# Patient Record
Sex: FEMALE
[De-identification: names, ages, dates, MRNs, and addresses within clinical notes are randomized per-mention and may not be internally consistent; named-entity substitution may affect disease eponyms.]

---

## 2017-05-17 ENCOUNTER — RESULT REVIEW (OUTPATIENT)
Age: 37
End: 2017-05-17

## 2018-08-13 ENCOUNTER — RESULT REVIEW (OUTPATIENT)
Age: 38
End: 2018-08-13

## 2018-11-01 ENCOUNTER — APPOINTMENT (OUTPATIENT)
Dept: HUMAN REPRODUCTION | Facility: CLINIC | Age: 38
End: 2018-11-01
Payer: COMMERCIAL

## 2018-11-01 PROCEDURE — 36415 COLL VENOUS BLD VENIPUNCTURE: CPT

## 2018-11-01 PROCEDURE — 99215 OFFICE O/P EST HI 40 MIN: CPT | Mod: 25

## 2018-11-07 ENCOUNTER — RX RENEWAL (OUTPATIENT)
Age: 38
End: 2018-11-07

## 2019-08-16 ENCOUNTER — APPOINTMENT (OUTPATIENT)
Dept: OBGYN | Facility: CLINIC | Age: 39
End: 2019-08-16

## 2019-10-01 ENCOUNTER — APPOINTMENT (OUTPATIENT)
Dept: OBGYN | Facility: CLINIC | Age: 39
End: 2019-10-01
Payer: COMMERCIAL

## 2019-10-01 VITALS
HEIGHT: 61 IN | DIASTOLIC BLOOD PRESSURE: 72 MMHG | WEIGHT: 112 LBS | SYSTOLIC BLOOD PRESSURE: 112 MMHG | BODY MASS INDEX: 21.14 KG/M2

## 2019-10-01 PROCEDURE — 99395 PREV VISIT EST AGE 18-39: CPT

## 2019-10-01 NOTE — HISTORY OF PRESENT ILLNESS
[Last Mammogram ___] : Last Mammogram was [unfilled] [Last Bone Density ___] : Last bone density studies [unfilled] [Last Colonoscopy ___] : Last colonoscopy [unfilled] [Last Pap ___] : Last cervical pap smear was [unfilled] [Definite:  ___ (Date)] : the last menstrual period was [unfilled] [Menarche Age: ____] : age at menarche was [unfilled] [Sexually Active] : is sexually active [Monogamous] : is monogamous [Contraception] : uses contraception [Oral Contraceptives] : uses oral contraceptives [Male ___] : [unfilled] male [No] : no

## 2019-10-01 NOTE — PHYSICAL EXAM
[Awake] : awake [Alert] : alert [Acute Distress] : no acute distress [Mass] : no breast mass [Nipple Discharge] : no nipple discharge [Axillary LAD] : no axillary lymphadenopathy [Soft] : soft [Tender] : non tender [Distended] : not distended [Oriented x3] : oriented to person, place, and time [Depressed Mood] : not depressed [Flat Affect] : affect not flat [Normal] : uterus [Labia Majora] : labia major [Labia Minora] : labia minora [Pap Obtained] : a Pap smear was performed [Tenderness] : nontender [Adnexa Tenderness] : were not tender

## 2019-10-01 NOTE — REVIEW OF SYSTEMS
[Nl] : Musculoskeletal [Fever] : no fever [Chills] : no chills [Chest Pain] : no chest pain [Dyspnea] : no shortness of breath [Abdominal Pain] : no abdominal pain [Vomiting] : no vomiting [Pelvic Pain] : no pelvic pain [Abn Vag Bleeding] : no abnormal vaginal bleeding

## 2019-10-07 LAB
CYTOLOGY CVX/VAG DOC THIN PREP: NORMAL
HPV HIGH+LOW RISK DNA PNL CVX: NOT DETECTED

## 2019-12-06 ENCOUNTER — APPOINTMENT (OUTPATIENT)
Dept: HUMAN REPRODUCTION | Facility: CLINIC | Age: 39
End: 2019-12-06
Payer: COMMERCIAL

## 2019-12-06 PROCEDURE — 99213 OFFICE O/P EST LOW 20 MIN: CPT | Mod: 25

## 2019-12-06 PROCEDURE — 36415 COLL VENOUS BLD VENIPUNCTURE: CPT

## 2019-12-06 PROCEDURE — 76830 TRANSVAGINAL US NON-OB: CPT

## 2019-12-13 ENCOUNTER — APPOINTMENT (OUTPATIENT)
Dept: HUMAN REPRODUCTION | Facility: CLINIC | Age: 39
End: 2019-12-13
Payer: COMMERCIAL

## 2019-12-13 PROCEDURE — 99213 OFFICE O/P EST LOW 20 MIN: CPT | Mod: 25

## 2019-12-13 PROCEDURE — 76830 TRANSVAGINAL US NON-OB: CPT

## 2019-12-16 ENCOUNTER — APPOINTMENT (OUTPATIENT)
Dept: HUMAN REPRODUCTION | Facility: CLINIC | Age: 39
End: 2019-12-16
Payer: COMMERCIAL

## 2019-12-16 PROCEDURE — 99213 OFFICE O/P EST LOW 20 MIN: CPT | Mod: 25

## 2019-12-16 PROCEDURE — 58322 ARTIFICIAL INSEMINATION: CPT

## 2020-01-03 ENCOUNTER — APPOINTMENT (OUTPATIENT)
Dept: HUMAN REPRODUCTION | Facility: CLINIC | Age: 40
End: 2020-01-03
Payer: COMMERCIAL

## 2020-01-03 PROCEDURE — 99212 OFFICE O/P EST SF 10 MIN: CPT

## 2020-01-03 PROCEDURE — 36415 COLL VENOUS BLD VENIPUNCTURE: CPT

## 2020-01-06 ENCOUNTER — APPOINTMENT (OUTPATIENT)
Dept: HUMAN REPRODUCTION | Facility: CLINIC | Age: 40
End: 2020-01-06
Payer: COMMERCIAL

## 2020-01-06 ENCOUNTER — TRANSCRIPTION ENCOUNTER (OUTPATIENT)
Age: 40
End: 2020-01-06

## 2020-01-06 PROCEDURE — 99213 OFFICE O/P EST LOW 20 MIN: CPT | Mod: 25

## 2020-01-06 PROCEDURE — 36415 COLL VENOUS BLD VENIPUNCTURE: CPT

## 2020-01-10 ENCOUNTER — APPOINTMENT (OUTPATIENT)
Dept: HUMAN REPRODUCTION | Facility: CLINIC | Age: 40
End: 2020-01-10
Payer: COMMERCIAL

## 2020-01-10 ENCOUNTER — APPOINTMENT (OUTPATIENT)
Dept: HUMAN REPRODUCTION | Facility: CLINIC | Age: 40
End: 2020-01-10

## 2020-01-10 PROCEDURE — 36415 COLL VENOUS BLD VENIPUNCTURE: CPT

## 2020-01-10 PROCEDURE — 99213 OFFICE O/P EST LOW 20 MIN: CPT | Mod: 25

## 2020-01-10 PROCEDURE — 76830 TRANSVAGINAL US NON-OB: CPT

## 2020-01-17 ENCOUNTER — APPOINTMENT (OUTPATIENT)
Dept: HUMAN REPRODUCTION | Facility: CLINIC | Age: 40
End: 2020-01-17
Payer: COMMERCIAL

## 2020-01-17 PROCEDURE — 99213 OFFICE O/P EST LOW 20 MIN: CPT | Mod: 25

## 2020-01-17 PROCEDURE — 76830 TRANSVAGINAL US NON-OB: CPT

## 2020-01-17 PROCEDURE — 36415 COLL VENOUS BLD VENIPUNCTURE: CPT

## 2020-01-18 ENCOUNTER — APPOINTMENT (OUTPATIENT)
Dept: HUMAN REPRODUCTION | Facility: CLINIC | Age: 40
End: 2020-01-18
Payer: COMMERCIAL

## 2020-01-18 PROCEDURE — 99213 OFFICE O/P EST LOW 20 MIN: CPT | Mod: 25

## 2020-01-18 PROCEDURE — 76830 TRANSVAGINAL US NON-OB: CPT

## 2020-01-18 PROCEDURE — 36415 COLL VENOUS BLD VENIPUNCTURE: CPT

## 2020-01-19 ENCOUNTER — APPOINTMENT (OUTPATIENT)
Dept: HUMAN REPRODUCTION | Facility: CLINIC | Age: 40
End: 2020-01-19
Payer: COMMERCIAL

## 2020-01-19 PROCEDURE — 58322 ARTIFICIAL INSEMINATION: CPT

## 2020-01-19 PROCEDURE — 89261 SPERM ISOLATION COMPLEX: CPT

## 2020-01-19 PROCEDURE — 99213 OFFICE O/P EST LOW 20 MIN: CPT | Mod: 25

## 2020-02-04 ENCOUNTER — APPOINTMENT (OUTPATIENT)
Dept: HUMAN REPRODUCTION | Facility: CLINIC | Age: 40
End: 2020-02-04
Payer: COMMERCIAL

## 2020-02-04 PROCEDURE — 99212 OFFICE O/P EST SF 10 MIN: CPT

## 2020-02-04 PROCEDURE — 36415 COLL VENOUS BLD VENIPUNCTURE: CPT

## 2020-02-06 ENCOUNTER — APPOINTMENT (OUTPATIENT)
Dept: HUMAN REPRODUCTION | Facility: CLINIC | Age: 40
End: 2020-02-06
Payer: COMMERCIAL

## 2020-02-06 PROCEDURE — 36415 COLL VENOUS BLD VENIPUNCTURE: CPT

## 2020-02-06 PROCEDURE — 99212 OFFICE O/P EST SF 10 MIN: CPT

## 2020-02-10 ENCOUNTER — APPOINTMENT (OUTPATIENT)
Dept: HUMAN REPRODUCTION | Facility: CLINIC | Age: 40
End: 2020-02-10
Payer: COMMERCIAL

## 2020-02-10 PROCEDURE — 36415 COLL VENOUS BLD VENIPUNCTURE: CPT

## 2020-02-10 PROCEDURE — 99212 OFFICE O/P EST SF 10 MIN: CPT

## 2020-02-13 ENCOUNTER — OUTPATIENT (OUTPATIENT)
Dept: OUTPATIENT SERVICES | Facility: HOSPITAL | Age: 40
LOS: 1 days | End: 2020-02-13
Payer: COMMERCIAL

## 2020-02-13 ENCOUNTER — APPOINTMENT (OUTPATIENT)
Dept: ULTRASOUND IMAGING | Facility: CLINIC | Age: 40
End: 2020-02-13
Payer: COMMERCIAL

## 2020-02-13 ENCOUNTER — APPOINTMENT (OUTPATIENT)
Dept: HUMAN REPRODUCTION | Facility: CLINIC | Age: 40
End: 2020-02-13
Payer: COMMERCIAL

## 2020-02-13 DIAGNOSIS — Z00.8 ENCOUNTER FOR OTHER GENERAL EXAMINATION: ICD-10-CM

## 2020-02-13 PROCEDURE — 36415 COLL VENOUS BLD VENIPUNCTURE: CPT

## 2020-02-13 PROCEDURE — 76815 OB US LIMITED FETUS(S): CPT

## 2020-02-13 PROCEDURE — 76817 TRANSVAGINAL US OBSTETRIC: CPT

## 2020-02-13 PROCEDURE — 76815 OB US LIMITED FETUS(S): CPT | Mod: 26

## 2020-02-13 PROCEDURE — 76817 TRANSVAGINAL US OBSTETRIC: CPT | Mod: 26

## 2020-02-13 PROCEDURE — 99213 OFFICE O/P EST LOW 20 MIN: CPT | Mod: 25

## 2020-02-15 ENCOUNTER — APPOINTMENT (OUTPATIENT)
Dept: HUMAN REPRODUCTION | Facility: CLINIC | Age: 40
End: 2020-02-15
Payer: COMMERCIAL

## 2020-02-15 PROCEDURE — 36415 COLL VENOUS BLD VENIPUNCTURE: CPT

## 2020-02-15 PROCEDURE — 99213 OFFICE O/P EST LOW 20 MIN: CPT | Mod: 25

## 2020-02-15 PROCEDURE — 76817 TRANSVAGINAL US OBSTETRIC: CPT

## 2020-02-18 ENCOUNTER — APPOINTMENT (OUTPATIENT)
Dept: HUMAN REPRODUCTION | Facility: CLINIC | Age: 40
End: 2020-02-18
Payer: COMMERCIAL

## 2020-02-18 PROCEDURE — 99213 OFFICE O/P EST LOW 20 MIN: CPT | Mod: 25

## 2020-02-18 PROCEDURE — 76817 TRANSVAGINAL US OBSTETRIC: CPT

## 2020-02-20 ENCOUNTER — APPOINTMENT (OUTPATIENT)
Dept: OBGYN | Facility: AMBULATORY SURGERY CENTER | Age: 40
End: 2020-02-20
Payer: COMMERCIAL

## 2020-02-20 ENCOUNTER — ASOB RESULT (OUTPATIENT)
Age: 40
End: 2020-02-20

## 2020-02-20 ENCOUNTER — APPOINTMENT (OUTPATIENT)
Dept: OBGYN | Facility: CLINIC | Age: 40
End: 2020-02-20
Payer: COMMERCIAL

## 2020-02-20 VITALS
HEIGHT: 61 IN | DIASTOLIC BLOOD PRESSURE: 76 MMHG | BODY MASS INDEX: 21.14 KG/M2 | WEIGHT: 112 LBS | SYSTOLIC BLOOD PRESSURE: 110 MMHG

## 2020-02-20 DIAGNOSIS — R87.619 UNSPECIFIED ABNORMAL CYTOLOGICAL FINDINGS IN SPECIMENS FROM CERVIX UTERI: ICD-10-CM

## 2020-02-20 DIAGNOSIS — O09.811 SUPERVISION OF PREGNANCY RESULTING FROM ASSISTED REPRODUCTIVE TECHNOLOGY, FIRST TRIMESTER: ICD-10-CM

## 2020-02-20 DIAGNOSIS — O09.521 SUPERVISION OF ELDERLY MULTIGRAVIDA, FIRST TRIMESTER: ICD-10-CM

## 2020-02-20 DIAGNOSIS — Z86.19 PERSONAL HISTORY OF OTHER INFECTIOUS AND PARASITIC DISEASES: ICD-10-CM

## 2020-02-20 DIAGNOSIS — O99.281 ENDOCRINE, NUTRITIONAL AND METABOLIC DISEASES COMPLICATING PREGNANCY, FIRST TRIMESTER: ICD-10-CM

## 2020-02-20 DIAGNOSIS — Z30.9 ENCOUNTER FOR CONTRACEPTIVE MANAGEMENT, UNSPECIFIED: ICD-10-CM

## 2020-02-20 DIAGNOSIS — Z3A.11 11 WEEKS GESTATION OF PREGNANCY: ICD-10-CM

## 2020-02-20 DIAGNOSIS — O09.11 SUPERVISION OF PREGNANCY WITH HISRY OF ECPIC PREGNANCY, FIRST TRIMESTER: ICD-10-CM

## 2020-02-20 DIAGNOSIS — E03.9 ENDOCRINE, NUTRITIONAL AND METABOLIC DISEASES COMPLICATING PREGNANCY, FIRST TRIMESTER: ICD-10-CM

## 2020-02-20 PROCEDURE — 76817 TRANSVAGINAL US OBSTETRIC: CPT

## 2020-02-20 PROCEDURE — 99214 OFFICE O/P EST MOD 30 MIN: CPT | Mod: 25

## 2020-02-20 RX ORDER — NORGESTIMATE AND ETHINYL ESTRADIOL 0.25-0.035
0.25-35 KIT ORAL DAILY
Qty: 3 | Refills: 3 | Status: DISCONTINUED | COMMUNITY
Start: 2019-10-01 | End: 2020-02-20

## 2020-02-20 RX ORDER — LEVOTHYROXINE SODIUM 0.03 MG/1
25 TABLET ORAL DAILY
Qty: 30 | Refills: 3 | Status: COMPLETED | COMMUNITY
Start: 2018-11-07 | End: 2020-02-20

## 2020-02-20 NOTE — REVIEW OF SYSTEMS
[Nl] : Integumentary [Chills] : no chills [Fever] : no fever [Abn Vag Bleeding] : no abnormal vaginal bleeding [Pelvic Pain] : no pelvic pain

## 2020-02-20 NOTE — HISTORY OF PRESENT ILLNESS
[___ Month(s) Ago] : [unfilled] month(s) ago [Good] : being in good health [Healthy Diet] : a healthy diet [Last Pap ___] : Last cervical pap smear was [unfilled] [Reproductive Age] : is of reproductive age [Menstrual Problems] : reports abnormal menses [Pregnancy History] : pregnancy history: [Hot Flashes] : hot flashes [Night Sweats] : night sweats [Definite:  ___ (Date)] : the last menstrual period was [unfilled] [Menarche Age: ____] : age at menarche was [unfilled] [Contraception] : uses contraception [Monogamous] : is monogamous [Sexually Active] : is sexually active [Male ___] : [unfilled] male [No] : no [Burning] : no burning [Itching] : no itching [Stinging] : no stinging [Mass] : no mass [Lesion] : no lesion [Soreness] : no soreness [Discharge] : no discharge [Mass (___cm)] : no palpable mass [Localized Pain] : no localized pain [Diffused Pain] : no diffused pain [Nipple Discharge] : no nipple discharge [Skin Color Change] : no skin color change

## 2020-02-27 ENCOUNTER — APPOINTMENT (OUTPATIENT)
Dept: OBGYN | Facility: CLINIC | Age: 40
End: 2020-02-27
Payer: COMMERCIAL

## 2020-02-27 ENCOUNTER — ASOB RESULT (OUTPATIENT)
Age: 40
End: 2020-02-27

## 2020-02-27 ENCOUNTER — NON-APPOINTMENT (OUTPATIENT)
Age: 40
End: 2020-02-27

## 2020-02-27 VITALS — WEIGHT: 113 LBS | BODY MASS INDEX: 21.35 KG/M2 | DIASTOLIC BLOOD PRESSURE: 66 MMHG | SYSTOLIC BLOOD PRESSURE: 104 MMHG

## 2020-02-27 LAB
BILIRUB UR QL STRIP: NORMAL
GLUCOSE UR-MCNC: NORMAL
HCG UR QL: 0.2 EU/DL
HGB UR QL STRIP.AUTO: NORMAL
KETONES UR-MCNC: NORMAL
LEUKOCYTE ESTERASE UR QL STRIP: NORMAL
NITRITE UR QL STRIP: NORMAL
PH UR STRIP: 7
PROT UR STRIP-MCNC: NORMAL
SP GR UR STRIP: 1.01

## 2020-02-27 PROCEDURE — 0500F INITIAL PRENATAL CARE VISIT: CPT

## 2020-02-27 PROCEDURE — 76817 TRANSVAGINAL US OBSTETRIC: CPT

## 2020-02-27 PROCEDURE — 99214 OFFICE O/P EST MOD 30 MIN: CPT | Mod: 25

## 2020-02-27 PROCEDURE — 36415 COLL VENOUS BLD VENIPUNCTURE: CPT

## 2020-02-29 LAB
ABO + RH PNL BLD: NORMAL
BASOPHILS # BLD AUTO: 0.03 K/UL
BASOPHILS NFR BLD AUTO: 0.5 %
BLD GP AB SCN SERPL QL: NORMAL
EOSINOPHIL # BLD AUTO: 0.16 K/UL
EOSINOPHIL NFR BLD AUTO: 2.6 %
ESTIMATED AVERAGE GLUCOSE: 97 MG/DL
HBA1C MFR BLD HPLC: 5 %
HBV SURFACE AG SER QL: NONREACTIVE
HCT VFR BLD CALC: 42.2 %
HGB BLD-MCNC: 13.6 G/DL
HIV1+2 AB SPEC QL IA.RAPID: NONREACTIVE
IMM GRANULOCYTES NFR BLD AUTO: 0.6 %
LYMPHOCYTES # BLD AUTO: 1.74 K/UL
LYMPHOCYTES NFR BLD AUTO: 27.9 %
MAN DIFF?: NORMAL
MCHC RBC-ENTMCNC: 30.8 PG
MCHC RBC-ENTMCNC: 32.2 GM/DL
MCV RBC AUTO: 95.7 FL
MONOCYTES # BLD AUTO: 0.49 K/UL
MONOCYTES NFR BLD AUTO: 7.9 %
NEUTROPHILS # BLD AUTO: 3.77 K/UL
NEUTROPHILS NFR BLD AUTO: 60.5 %
PLATELET # BLD AUTO: 378 K/UL
RBC # BLD: 4.41 M/UL
RBC # FLD: 13.2 %
T PALLIDUM AB SER QL IA: NEGATIVE
TSH SERPL-ACNC: 1.57 UIU/ML
WBC # FLD AUTO: 6.23 K/UL

## 2020-03-02 ENCOUNTER — TRANSCRIPTION ENCOUNTER (OUTPATIENT)
Age: 40
End: 2020-03-02

## 2020-03-03 LAB
B19V IGG SER QL IA: 8 INDEX
B19V IGG+IGM SER-IMP: NORMAL
B19V IGG+IGM SER-IMP: POSITIVE
B19V IGM FLD-ACNC: 0.2
B19V IGM SER-ACNC: NEGATIVE
CMV IGG SERPL QL: <0.2 U/ML
CMV IGG SERPL-IMP: NEGATIVE
CMV IGM SERPL QL: <8 AU/ML
CMV IGM SERPL QL: NEGATIVE
HGB A MFR BLD: 97.2 %
HGB A2 MFR BLD: 2.8 %
HGB FRACT BLD-IMP: NORMAL
MEV IGG FLD QL IA: >300 AU/ML
MEV IGG+IGM SER-IMP: POSITIVE
RUBV IGG FLD-ACNC: 10.8 INDEX
RUBV IGG SER-IMP: POSITIVE
T GONDII AB SER-IMP: NEGATIVE
T GONDII AB SER-IMP: NEGATIVE
T GONDII IGG SER QL: 3.9 IU/ML
T GONDII IGM SER QL: <3 AU/ML

## 2020-03-04 LAB — FMR1 GENE MUT ANL BLD/T: NORMAL

## 2020-03-07 LAB
AR GENE MUT ANL BLD/T: NEGATIVE
CFTR MUT TESTED BLD/T: NEGATIVE

## 2020-03-16 ENCOUNTER — APPOINTMENT (OUTPATIENT)
Dept: OBGYN | Facility: HOSPITAL | Age: 40
End: 2020-03-16

## 2020-03-16 ENCOUNTER — APPOINTMENT (OUTPATIENT)
Dept: OBGYN | Facility: CLINIC | Age: 40
End: 2020-03-16
Payer: COMMERCIAL

## 2020-03-16 ENCOUNTER — ASOB RESULT (OUTPATIENT)
Age: 40
End: 2020-03-16

## 2020-03-16 VITALS
DIASTOLIC BLOOD PRESSURE: 78 MMHG | HEIGHT: 61 IN | WEIGHT: 114 LBS | BODY MASS INDEX: 21.52 KG/M2 | SYSTOLIC BLOOD PRESSURE: 132 MMHG

## 2020-03-16 DIAGNOSIS — Z81.8 FAMILY HISTORY OF OTHER MENTAL AND BEHAVIORAL DISORDERS: ICD-10-CM

## 2020-03-16 PROCEDURE — 76817 TRANSVAGINAL US OBSTETRIC: CPT

## 2020-03-16 PROCEDURE — 99213 OFFICE O/P EST LOW 20 MIN: CPT | Mod: 25

## 2020-03-16 PROCEDURE — 36415 COLL VENOUS BLD VENIPUNCTURE: CPT

## 2020-03-16 PROCEDURE — 81003 URINALYSIS AUTO W/O SCOPE: CPT | Mod: QW

## 2020-03-18 ENCOUNTER — TRANSCRIPTION ENCOUNTER (OUTPATIENT)
Age: 40
End: 2020-03-18

## 2020-03-24 ENCOUNTER — APPOINTMENT (OUTPATIENT)
Dept: OBGYN | Facility: CLINIC | Age: 40
End: 2020-03-24

## 2020-03-24 LAB — HCG SERPL-MCNC: 1380 MIU/ML

## 2020-03-26 LAB — HCG SERPL-MCNC: 44 MIU/ML

## 2020-03-27 LAB
BILIRUB UR QL STRIP: NORMAL
GLUCOSE UR-MCNC: NORMAL
HCG UR QL: 0.2 EU/DL
HGB UR QL STRIP.AUTO: ABNORMAL
KETONES UR-MCNC: NORMAL
LEUKOCYTE ESTERASE UR QL STRIP: ABNORMAL
NITRITE UR QL STRIP: NORMAL
PH UR STRIP: 5.5
PROT UR STRIP-MCNC: ABNORMAL
SP GR UR STRIP: 1.01

## 2020-03-30 ENCOUNTER — APPOINTMENT (OUTPATIENT)
Dept: OBGYN | Facility: CLINIC | Age: 40
End: 2020-03-30

## 2020-04-03 LAB — HCG SERPL-MCNC: 5 MIU/ML

## 2020-04-06 ENCOUNTER — APPOINTMENT (OUTPATIENT)
Dept: MATERNAL FETAL MEDICINE | Facility: CLINIC | Age: 40
End: 2020-04-06

## 2020-04-06 ENCOUNTER — APPOINTMENT (OUTPATIENT)
Dept: ANTEPARTUM | Facility: CLINIC | Age: 40
End: 2020-04-06

## 2020-04-09 ENCOUNTER — TRANSCRIPTION ENCOUNTER (OUTPATIENT)
Age: 40
End: 2020-04-09

## 2020-04-09 LAB — HCG SERPL-MCNC: 1 MIU/ML

## 2020-04-21 ENCOUNTER — APPOINTMENT (OUTPATIENT)
Dept: MATERNAL FETAL MEDICINE | Facility: CLINIC | Age: 40
End: 2020-04-21

## 2020-04-21 ENCOUNTER — APPOINTMENT (OUTPATIENT)
Dept: ANTEPARTUM | Facility: CLINIC | Age: 40
End: 2020-04-21

## 2020-05-26 ENCOUNTER — APPOINTMENT (OUTPATIENT)
Dept: ANTEPARTUM | Facility: CLINIC | Age: 40
End: 2020-05-26

## 2020-06-15 ENCOUNTER — APPOINTMENT (OUTPATIENT)
Dept: HUMAN REPRODUCTION | Facility: CLINIC | Age: 40
End: 2020-06-15

## 2020-11-18 ENCOUNTER — APPOINTMENT (OUTPATIENT)
Dept: HUMAN REPRODUCTION | Facility: CLINIC | Age: 40
End: 2020-11-18
Payer: COMMERCIAL

## 2020-11-18 PROCEDURE — 99215 OFFICE O/P EST HI 40 MIN: CPT | Mod: 95

## 2020-12-07 ENCOUNTER — APPOINTMENT (OUTPATIENT)
Dept: HUMAN REPRODUCTION | Facility: CLINIC | Age: 40
End: 2020-12-07
Payer: COMMERCIAL

## 2020-12-07 PROCEDURE — 76830 TRANSVAGINAL US NON-OB: CPT

## 2020-12-07 PROCEDURE — 99213Y: CUSTOM | Mod: 25

## 2020-12-07 PROCEDURE — 36415 COLL VENOUS BLD VENIPUNCTURE: CPT

## 2020-12-07 PROCEDURE — 99072 ADDL SUPL MATRL&STAF TM PHE: CPT

## 2020-12-14 ENCOUNTER — APPOINTMENT (OUTPATIENT)
Dept: HUMAN REPRODUCTION | Facility: CLINIC | Age: 40
End: 2020-12-14
Payer: COMMERCIAL

## 2020-12-14 PROCEDURE — 99072 ADDL SUPL MATRL&STAF TM PHE: CPT

## 2020-12-14 PROCEDURE — 76830 TRANSVAGINAL US NON-OB: CPT

## 2020-12-14 PROCEDURE — 99213 OFFICE O/P EST LOW 20 MIN: CPT | Mod: 25

## 2020-12-14 PROCEDURE — 36415 COLL VENOUS BLD VENIPUNCTURE: CPT

## 2020-12-31 ENCOUNTER — APPOINTMENT (OUTPATIENT)
Dept: HUMAN REPRODUCTION | Facility: CLINIC | Age: 40
End: 2020-12-31
Payer: COMMERCIAL

## 2020-12-31 PROCEDURE — 76830 TRANSVAGINAL US NON-OB: CPT

## 2020-12-31 PROCEDURE — 99072 ADDL SUPL MATRL&STAF TM PHE: CPT

## 2020-12-31 PROCEDURE — 36415 COLL VENOUS BLD VENIPUNCTURE: CPT

## 2020-12-31 PROCEDURE — 99213 OFFICE O/P EST LOW 20 MIN: CPT | Mod: 25

## 2021-01-11 ENCOUNTER — APPOINTMENT (OUTPATIENT)
Dept: HUMAN REPRODUCTION | Facility: CLINIC | Age: 41
End: 2021-01-11
Payer: COMMERCIAL

## 2021-01-11 PROCEDURE — 99072 ADDL SUPL MATRL&STAF TM PHE: CPT

## 2021-01-11 PROCEDURE — 99213 OFFICE O/P EST LOW 20 MIN: CPT | Mod: 25

## 2021-01-11 PROCEDURE — 76830 TRANSVAGINAL US NON-OB: CPT

## 2021-01-27 ENCOUNTER — APPOINTMENT (OUTPATIENT)
Dept: HUMAN REPRODUCTION | Facility: CLINIC | Age: 41
End: 2021-01-27
Payer: COMMERCIAL

## 2021-01-27 PROCEDURE — 99211 OFF/OP EST MAY X REQ PHY/QHP: CPT | Mod: 25

## 2021-01-27 PROCEDURE — 99072 ADDL SUPL MATRL&STAF TM PHE: CPT

## 2021-01-27 PROCEDURE — 36415 COLL VENOUS BLD VENIPUNCTURE: CPT

## 2021-01-29 ENCOUNTER — APPOINTMENT (OUTPATIENT)
Dept: HUMAN REPRODUCTION | Facility: CLINIC | Age: 41
End: 2021-01-29
Payer: COMMERCIAL

## 2021-01-29 PROCEDURE — 99072 ADDL SUPL MATRL&STAF TM PHE: CPT

## 2021-01-29 PROCEDURE — 36415 COLL VENOUS BLD VENIPUNCTURE: CPT

## 2021-01-29 PROCEDURE — 99211 OFF/OP EST MAY X REQ PHY/QHP: CPT | Mod: 25

## 2021-02-05 ENCOUNTER — APPOINTMENT (OUTPATIENT)
Dept: HUMAN REPRODUCTION | Facility: CLINIC | Age: 41
End: 2021-02-05
Payer: COMMERCIAL

## 2021-02-05 PROCEDURE — 99213 OFFICE O/P EST LOW 20 MIN: CPT | Mod: 25

## 2021-02-05 PROCEDURE — 99072 ADDL SUPL MATRL&STAF TM PHE: CPT

## 2021-02-05 PROCEDURE — 76817 TRANSVAGINAL US OBSTETRIC: CPT

## 2021-02-05 PROCEDURE — 36415 COLL VENOUS BLD VENIPUNCTURE: CPT

## 2021-02-12 ENCOUNTER — APPOINTMENT (OUTPATIENT)
Dept: HUMAN REPRODUCTION | Facility: CLINIC | Age: 41
End: 2021-02-12
Payer: COMMERCIAL

## 2021-02-12 PROCEDURE — 99072 ADDL SUPL MATRL&STAF TM PHE: CPT

## 2021-02-12 PROCEDURE — 99213 OFFICE O/P EST LOW 20 MIN: CPT | Mod: 25

## 2021-02-12 PROCEDURE — 76817 TRANSVAGINAL US OBSTETRIC: CPT

## 2021-02-12 PROCEDURE — 36415 COLL VENOUS BLD VENIPUNCTURE: CPT

## 2021-02-16 ENCOUNTER — APPOINTMENT (OUTPATIENT)
Dept: OBGYN | Facility: CLINIC | Age: 41
End: 2021-02-16
Payer: COMMERCIAL

## 2021-02-16 PROCEDURE — 99212 OFFICE O/P EST SF 10 MIN: CPT | Mod: 95

## 2021-02-16 NOTE — REASON FOR VISIT
[Home] : at home, [unfilled] , at the time of the visit. [Medical Office: ([facility])___] : at the medical office located in  [Verbal consent obtained from patient] : the patient, [unfilled]

## 2021-02-16 NOTE — HISTORY OF PRESENT ILLNESS
[FreeTextEntry1] : \par Patients name and date of birth were verified and verbal consent was obtained.\par \par She was informed of her ability to request an in office visit and that an in-office visit may be advised at the conclusion of this encounter.\par \par We discussed and provided via [email or web address]'s telehealth application will incorporate security protocols to protect PHI.  There is also the possibility of connection disruption during the visit.  Every effort will be made to re-establish a connection if disconnection occurs. However, there is a possibility the session may be terminated.  \par \par She was informed of telehealth services incur charges similar to an office visit.\par \par She provided verbal consent and wishes to proceed with the visit.\par \par __________________________\par \par Lily is 39 y/o  who presents for telehealth visit for confirmation of pregnancy.\par \par LMP was 2020. She has been seeing Dr. Kc and tested positive for pregnancy in office 2 weeks ago. \par \par Last pap was 10/2019.\par \par Denies vaginal bleeding.\par \par Denies pelvic and vaginal pain.\par \par Reports taking prenatal vitamins.\par \par \par

## 2021-02-17 ENCOUNTER — APPOINTMENT (OUTPATIENT)
Dept: OBGYN | Facility: CLINIC | Age: 41
End: 2021-02-17

## 2021-02-18 ENCOUNTER — APPOINTMENT (OUTPATIENT)
Dept: HUMAN REPRODUCTION | Facility: CLINIC | Age: 41
End: 2021-02-18
Payer: COMMERCIAL

## 2021-02-18 PROCEDURE — 99072 ADDL SUPL MATRL&STAF TM PHE: CPT

## 2021-02-18 PROCEDURE — 36415 COLL VENOUS BLD VENIPUNCTURE: CPT

## 2021-02-18 PROCEDURE — 76830 TRANSVAGINAL US NON-OB: CPT

## 2021-02-18 PROCEDURE — 99213 OFFICE O/P EST LOW 20 MIN: CPT | Mod: 25

## 2021-02-19 ENCOUNTER — APPOINTMENT (OUTPATIENT)
Dept: OBGYN | Facility: CLINIC | Age: 41
End: 2021-02-19
Payer: COMMERCIAL

## 2021-02-19 VITALS
BODY MASS INDEX: 22.09 KG/M2 | HEIGHT: 61 IN | DIASTOLIC BLOOD PRESSURE: 70 MMHG | SYSTOLIC BLOOD PRESSURE: 100 MMHG | WEIGHT: 117 LBS | TEMPERATURE: 96.5 F

## 2021-02-19 LAB
HCG UR QL: POSITIVE
QUALITY CONTROL: YES

## 2021-02-19 PROCEDURE — 99395 PREV VISIT EST AGE 18-39: CPT

## 2021-02-19 PROCEDURE — 99213 OFFICE O/P EST LOW 20 MIN: CPT | Mod: 25

## 2021-02-19 PROCEDURE — 99072 ADDL SUPL MATRL&STAF TM PHE: CPT

## 2021-02-19 PROCEDURE — 81025 URINE PREGNANCY TEST: CPT

## 2021-02-19 NOTE — HISTORY OF PRESENT ILLNESS
[Patient reported PAP Smear was normal] : Patient reported PAP Smear was normal [HIV Test offered] : HIV Test offered [Syphilis test offered] : Syphilis test offered [HPV test offered] : HPV test offered [Hepatitis B test offered] : Hepatitis B test offered [Hepatitis C test offered] : Hepatitis C test offered [N] : Patient does not use contraception [Monogamous (Male Partner)] : is monogamous with a male partner [Y] : Positive pregnancy history [Menarche Age: ____] : age at menarche was [unfilled] [Currently Active] : currently active [Men] : men [Vaginal] : vaginal [No] : No [Patient refuses STI testing] : Patient refuses STI testing [TextBox_4] : PATIENT PRESENTS FOR ANNUAL EXAM. [PapSmeardate] : 10/01/19 [TextBox_31] : NEG [HIVDate] : 02/27/20 [TextBox_53] : NEG [SyphilisDate] : 02/27/20 [TextBox_58] : NEG [TextBox_78] : NEG [HPVDate] : 10/01/19 [HepatitisBDate] : 02/27/20 [TextBox_83] : NEG [HepatitisCDate] : 02/27/20 [LMPDate] : 12/31/20 [TextBox_88] : NEG [PGxTotal] : 7 [[facility]] : 2 [[facility]] : 2 [PGHxABSpont] : 2 [PGxEctopic] : 2 [FreeTextEntry1] : 12/31/20

## 2021-02-19 NOTE — DISCUSSION/SUMMARY
[FreeTextEntry1] : PAP/HPV/ GC Chlamydia performed\par \par REviewed healthy eating and increasing water intake.\par Advised pt to avoid raw seafood, deli meats and unpasteurized dairy.\par \par Reviewed call parameters.\par \par Pt has a New OB appt in 2 weeks with

## 2021-02-24 LAB
C TRACH RRNA SPEC QL NAA+PROBE: NOT DETECTED
HPV HIGH+LOW RISK DNA PNL CVX: NOT DETECTED
N GONORRHOEA RRNA SPEC QL NAA+PROBE: NOT DETECTED
SOURCE TP AMPLIFICATION: NORMAL

## 2021-02-25 LAB — CYTOLOGY CVX/VAG DOC THIN PREP: ABNORMAL

## 2021-03-03 ENCOUNTER — NON-APPOINTMENT (OUTPATIENT)
Age: 41
End: 2021-03-03

## 2021-03-03 ENCOUNTER — APPOINTMENT (OUTPATIENT)
Dept: OBGYN | Facility: CLINIC | Age: 41
End: 2021-03-03
Payer: COMMERCIAL

## 2021-03-03 ENCOUNTER — ASOB RESULT (OUTPATIENT)
Age: 41
End: 2021-03-03

## 2021-03-03 VITALS
DIASTOLIC BLOOD PRESSURE: 74 MMHG | WEIGHT: 117 LBS | BODY MASS INDEX: 22.09 KG/M2 | HEIGHT: 61 IN | SYSTOLIC BLOOD PRESSURE: 106 MMHG

## 2021-03-03 LAB
BILIRUB UR QL STRIP: NORMAL
GLUCOSE UR-MCNC: NORMAL
HCG UR QL: 1 EU/DL
HGB UR QL STRIP.AUTO: ABNORMAL
KETONES UR-MCNC: NORMAL
LEUKOCYTE ESTERASE UR QL STRIP: NORMAL
NITRITE UR QL STRIP: NORMAL
PH UR STRIP: 6
PROT UR STRIP-MCNC: NORMAL
SP GR UR STRIP: 1.02

## 2021-03-03 PROCEDURE — 36415 COLL VENOUS BLD VENIPUNCTURE: CPT

## 2021-03-03 PROCEDURE — 99072 ADDL SUPL MATRL&STAF TM PHE: CPT

## 2021-03-03 PROCEDURE — 76817 TRANSVAGINAL US OBSTETRIC: CPT

## 2021-03-03 PROCEDURE — 0500F INITIAL PRENATAL CARE VISIT: CPT

## 2021-03-18 ENCOUNTER — APPOINTMENT (OUTPATIENT)
Dept: MATERNAL FETAL MEDICINE | Facility: CLINIC | Age: 41
End: 2021-03-18
Payer: COMMERCIAL

## 2021-03-18 ENCOUNTER — ASOB RESULT (OUTPATIENT)
Age: 41
End: 2021-03-18

## 2021-03-18 PROCEDURE — 99202 OFFICE O/P NEW SF 15 MIN: CPT | Mod: 95

## 2021-03-25 ENCOUNTER — NON-APPOINTMENT (OUTPATIENT)
Age: 41
End: 2021-03-25

## 2021-03-25 ENCOUNTER — APPOINTMENT (OUTPATIENT)
Dept: OBGYN | Facility: CLINIC | Age: 41
End: 2021-03-25
Payer: COMMERCIAL

## 2021-03-25 ENCOUNTER — ASOB RESULT (OUTPATIENT)
Age: 41
End: 2021-03-25

## 2021-03-25 VITALS
HEIGHT: 61 IN | SYSTOLIC BLOOD PRESSURE: 120 MMHG | WEIGHT: 119 LBS | BODY MASS INDEX: 22.47 KG/M2 | DIASTOLIC BLOOD PRESSURE: 68 MMHG

## 2021-03-25 LAB
ABO + RH PNL BLD: NORMAL
AR GENE MUT ANL BLD/T: NORMAL
B19V IGG SER QL IA: 8.74 INDEX
B19V IGG+IGM SER-IMP: NORMAL
B19V IGG+IGM SER-IMP: POSITIVE
B19V IGM FLD-ACNC: 0.2 INDEX
B19V IGM SER-ACNC: NEGATIVE
BASOPHILS # BLD AUTO: 0.02 K/UL
BASOPHILS NFR BLD AUTO: 0.2 %
BILIRUB UR QL STRIP: NORMAL
BLD GP AB SCN SERPL QL: NORMAL
CFTR MUT TESTED BLD/T: NEGATIVE
CMV IGG SERPL QL: <0.2 U/ML
CMV IGG SERPL-IMP: NEGATIVE
CMV IGM SERPL QL: <8 AU/ML
CMV IGM SERPL QL: NEGATIVE
COLLECTION METHOD: NORMAL
EOSINOPHIL # BLD AUTO: 0.31 K/UL
EOSINOPHIL NFR BLD AUTO: 3.9 %
ESTIMATED AVERAGE GLUCOSE: 94 MG/DL
FMR1 GENE MUT ANL BLD/T: NORMAL
GLUCOSE UR-MCNC: NORMAL
HBA1C MFR BLD HPLC: 4.9 %
HBV SURFACE AG SER QL: NONREACTIVE
HCG UR QL: 0.2 EU/DL
HCT VFR BLD CALC: 38.4 %
HGB A MFR BLD: 97.4 %
HGB A2 MFR BLD: 2.6 %
HGB BLD-MCNC: 12.7 G/DL
HGB FRACT BLD-IMP: NORMAL
HGB UR QL STRIP.AUTO: NORMAL
HIV1+2 AB SPEC QL IA.RAPID: NONREACTIVE
IMM GRANULOCYTES NFR BLD AUTO: 0.2 %
KETONES UR-MCNC: NORMAL
LEUKOCYTE ESTERASE UR QL STRIP: NORMAL
LYMPHOCYTES # BLD AUTO: 2.46 K/UL
LYMPHOCYTES NFR BLD AUTO: 30.7 %
M TB IFN-G BLD-IMP: NEGATIVE
MAN DIFF?: NORMAL
MCHC RBC-ENTMCNC: 30.7 PG
MCHC RBC-ENTMCNC: 33.1 GM/DL
MCV RBC AUTO: 92.8 FL
MEV IGG FLD QL IA: >300 AU/ML
MEV IGG+IGM SER-IMP: POSITIVE
MEV IGM SER QL: <0.91 ISR
MONOCYTES # BLD AUTO: 0.79 K/UL
MONOCYTES NFR BLD AUTO: 9.9 %
NEUTROPHILS # BLD AUTO: 4.42 K/UL
NEUTROPHILS NFR BLD AUTO: 55.1 %
NITRITE UR QL STRIP: NORMAL
PH UR STRIP: 5.5
PLATELET # BLD AUTO: 348 K/UL
PROT UR STRIP-MCNC: NORMAL
QUANTIFERON TB PLUS MITOGEN MINUS NIL: 9.47 IU/ML
QUANTIFERON TB PLUS NIL: 0.01 IU/ML
QUANTIFERON TB PLUS TB1 MINUS NIL: 0 IU/ML
QUANTIFERON TB PLUS TB2 MINUS NIL: 0 IU/ML
RBC # BLD: 4.14 M/UL
RBC # FLD: 13.1 %
RUBV IGG FLD-ACNC: 12.2 INDEX
RUBV IGG SER-IMP: POSITIVE
SP GR UR STRIP: 1.02
T GONDII AB SER-IMP: NEGATIVE
T GONDII AB SER-IMP: NEGATIVE
T GONDII IGG SER QL: 3 IU/ML
T GONDII IGM SER QL: <3 AU/ML
T PALLIDUM AB SER QL IA: NEGATIVE
T3FREE SERPL-MCNC: 3.07 PG/ML
T4 FREE SERPL-MCNC: 1.5 NG/DL
TSH SERPL-ACNC: 1.1 UIU/ML
VZV AB TITR SER: POSITIVE
VZV IGG SER IF-ACNC: 245.5 INDEX
WBC # FLD AUTO: 8.02 K/UL

## 2021-03-25 PROCEDURE — 99072 ADDL SUPL MATRL&STAF TM PHE: CPT

## 2021-03-25 PROCEDURE — 0502F SUBSEQUENT PRENATAL CARE: CPT

## 2021-03-25 PROCEDURE — 76813 OB US NUCHAL MEAS 1 GEST: CPT

## 2021-03-25 PROCEDURE — 36415 COLL VENOUS BLD VENIPUNCTURE: CPT

## 2021-04-06 ENCOUNTER — NON-APPOINTMENT (OUTPATIENT)
Age: 41
End: 2021-04-06

## 2021-04-09 ENCOUNTER — NON-APPOINTMENT (OUTPATIENT)
Age: 41
End: 2021-04-09

## 2021-04-20 ENCOUNTER — NON-APPOINTMENT (OUTPATIENT)
Age: 41
End: 2021-04-20

## 2021-04-20 ENCOUNTER — APPOINTMENT (OUTPATIENT)
Dept: OBGYN | Facility: CLINIC | Age: 41
End: 2021-04-20
Payer: COMMERCIAL

## 2021-04-20 VITALS
BODY MASS INDEX: 23.41 KG/M2 | DIASTOLIC BLOOD PRESSURE: 62 MMHG | SYSTOLIC BLOOD PRESSURE: 104 MMHG | WEIGHT: 124 LBS | HEIGHT: 61 IN

## 2021-04-20 LAB
1ST TRIMESTER DATA: NORMAL
ADDENDUM DOC: NORMAL
AFP PNL SERPL: NORMAL
AFP SERPL-ACNC: NORMAL
BILIRUB UR QL STRIP: NORMAL
CLARI ADDITIONAL INFO: NORMAL
CLARI CHROMOSOME 13: NORMAL
CLARI CHROMOSOME 18: NORMAL
CLARI CHROMOSOME 21: NORMAL
CLARI SEX CHROMOSOMES: NORMAL
CLARITEST NIPT: NORMAL
CLARITY UR: CLEAR
CLINICAL BIOCHEMIST REVIEW: NORMAL
COLLECTION METHOD: NORMAL
FREE BETA HCG 1ST TRIMESTER: NORMAL
GLUCOSE 1H P 50 G GLC PO SERPL-MCNC: 92 MG/DL
GLUCOSE UR-MCNC: NORMAL
HCG UR QL: 1 EU/DL
HGB UR QL STRIP.AUTO: NORMAL
KETONES UR-MCNC: NORMAL
LEUKOCYTE ESTERASE UR QL STRIP: NORMAL
Lab: NORMAL
NASAL BONE: PRESENT
NITRITE UR QL STRIP: NORMAL
NOTES NTD: NORMAL
NT: NORMAL
PAPP-A SERPL-ACNC: NORMAL
PH UR STRIP: 6
PROT UR STRIP-MCNC: NORMAL
SP GR UR STRIP: 1.03
TRISOMY 18/3: NORMAL

## 2021-04-20 PROCEDURE — 0502F SUBSEQUENT PRENATAL CARE: CPT

## 2021-05-25 ENCOUNTER — NON-APPOINTMENT (OUTPATIENT)
Age: 41
End: 2021-05-25

## 2021-05-25 ENCOUNTER — ASOB RESULT (OUTPATIENT)
Age: 41
End: 2021-05-25

## 2021-05-25 ENCOUNTER — APPOINTMENT (OUTPATIENT)
Dept: ANTEPARTUM | Facility: CLINIC | Age: 41
End: 2021-05-25
Payer: COMMERCIAL

## 2021-05-25 PROCEDURE — 99072 ADDL SUPL MATRL&STAF TM PHE: CPT

## 2021-05-25 PROCEDURE — 76805 OB US >/= 14 WKS SNGL FETUS: CPT

## 2021-05-26 ENCOUNTER — NON-APPOINTMENT (OUTPATIENT)
Age: 41
End: 2021-05-26

## 2021-05-26 ENCOUNTER — APPOINTMENT (OUTPATIENT)
Dept: OBGYN | Facility: CLINIC | Age: 41
End: 2021-05-26
Payer: COMMERCIAL

## 2021-05-26 VITALS
DIASTOLIC BLOOD PRESSURE: 62 MMHG | HEIGHT: 61 IN | BODY MASS INDEX: 24.17 KG/M2 | SYSTOLIC BLOOD PRESSURE: 110 MMHG | WEIGHT: 128 LBS

## 2021-05-26 LAB
BILIRUB UR QL STRIP: NORMAL
COLLECTION METHOD: NORMAL
GLUCOSE UR-MCNC: NORMAL
HCG UR QL: 1 EU/DL
HGB UR QL STRIP.AUTO: ABNORMAL
KETONES UR-MCNC: NORMAL
LEUKOCYTE ESTERASE UR QL STRIP: ABNORMAL
NITRITE UR QL STRIP: NORMAL
PH UR STRIP: 5.5
PROT UR STRIP-MCNC: NORMAL
SP GR UR STRIP: 1.02

## 2021-05-26 PROCEDURE — 0502F SUBSEQUENT PRENATAL CARE: CPT

## 2021-07-08 ENCOUNTER — NON-APPOINTMENT (OUTPATIENT)
Age: 41
End: 2021-07-08

## 2021-07-08 ENCOUNTER — APPOINTMENT (OUTPATIENT)
Dept: OBGYN | Facility: CLINIC | Age: 41
End: 2021-07-08

## 2021-07-08 VITALS
TEMPERATURE: 97.6 F | WEIGHT: 131 LBS | BODY MASS INDEX: 24.73 KG/M2 | DIASTOLIC BLOOD PRESSURE: 68 MMHG | SYSTOLIC BLOOD PRESSURE: 120 MMHG | HEIGHT: 61 IN

## 2021-07-08 DIAGNOSIS — E03.9 HYPOTHYROIDISM, UNSPECIFIED: ICD-10-CM

## 2021-07-08 LAB
BILIRUB UR QL STRIP: NORMAL
GLUCOSE UR-MCNC: NORMAL
HCG UR QL: 1 EU/DL
HGB UR QL STRIP.AUTO: NORMAL
KETONES UR-MCNC: NORMAL
LEUKOCYTE ESTERASE UR QL STRIP: NORMAL
NITRITE UR QL STRIP: NORMAL
PH UR STRIP: 7.5
PROT UR STRIP-MCNC: NORMAL
SP GR UR STRIP: 1.01

## 2021-07-08 RX ORDER — LEVOTHYROXINE SODIUM 0.07 MG/1
75 TABLET ORAL DAILY
Qty: 30 | Refills: 1 | Status: ACTIVE | COMMUNITY
Start: 2021-07-08 | End: 1900-01-01

## 2021-07-13 LAB
BASOPHILS # BLD AUTO: 0.04 K/UL
BASOPHILS NFR BLD AUTO: 0.4 %
EOSINOPHIL # BLD AUTO: 0.21 K/UL
EOSINOPHIL NFR BLD AUTO: 2.3 %
GLUCOSE 1H P 50 G GLC PO SERPL-MCNC: 98 MG/DL
HCT VFR BLD CALC: 40.6 %
HGB BLD-MCNC: 12.5 G/DL
IMM GRANULOCYTES NFR BLD AUTO: 1.1 %
LYMPHOCYTES # BLD AUTO: 1.5 K/UL
LYMPHOCYTES NFR BLD AUTO: 16.1 %
MAN DIFF?: NORMAL
MCHC RBC-ENTMCNC: 30.8 GM/DL
MCHC RBC-ENTMCNC: 31 PG
MCV RBC AUTO: 100.7 FL
MONOCYTES # BLD AUTO: 0.59 K/UL
MONOCYTES NFR BLD AUTO: 6.4 %
NEUTROPHILS # BLD AUTO: 6.85 K/UL
NEUTROPHILS NFR BLD AUTO: 73.7 %
PLATELET # BLD AUTO: 275 K/UL
RBC # BLD: 4.03 M/UL
RBC # FLD: 14.2 %
T3FREE SERPL-MCNC: 2.47 PG/ML
T4 FREE SERPL-MCNC: 1 NG/DL
TSH SERPL-ACNC: 1.65 UIU/ML
WBC # FLD AUTO: 9.29 K/UL

## 2021-07-22 ENCOUNTER — NON-APPOINTMENT (OUTPATIENT)
Age: 41
End: 2021-07-22

## 2021-07-22 ENCOUNTER — APPOINTMENT (OUTPATIENT)
Dept: OBGYN | Facility: CLINIC | Age: 41
End: 2021-07-22
Payer: COMMERCIAL

## 2021-07-22 VITALS
BODY MASS INDEX: 25.68 KG/M2 | HEIGHT: 61 IN | SYSTOLIC BLOOD PRESSURE: 118 MMHG | WEIGHT: 136 LBS | DIASTOLIC BLOOD PRESSURE: 72 MMHG

## 2021-07-22 LAB
BILIRUB UR QL STRIP: NORMAL
COLLECTION METHOD: NORMAL
GLUCOSE UR-MCNC: NORMAL
HCG UR QL: 0.2 EU/DL
HGB UR QL STRIP.AUTO: ABNORMAL
KETONES UR-MCNC: NORMAL
LEUKOCYTE ESTERASE UR QL STRIP: NORMAL
NITRITE UR QL STRIP: NORMAL
PH UR STRIP: 6
PROT UR STRIP-MCNC: NORMAL
SP GR UR STRIP: 1.02

## 2021-07-22 PROCEDURE — 0502F SUBSEQUENT PRENATAL CARE: CPT

## 2021-07-23 ENCOUNTER — NON-APPOINTMENT (OUTPATIENT)
Age: 41
End: 2021-07-23

## 2021-08-03 ENCOUNTER — NON-APPOINTMENT (OUTPATIENT)
Age: 41
End: 2021-08-03

## 2021-08-04 ENCOUNTER — APPOINTMENT (OUTPATIENT)
Dept: OBGYN | Facility: CLINIC | Age: 41
End: 2021-08-04
Payer: COMMERCIAL

## 2021-08-04 ENCOUNTER — ASOB RESULT (OUTPATIENT)
Age: 41
End: 2021-08-04

## 2021-08-04 VITALS — SYSTOLIC BLOOD PRESSURE: 105 MMHG | WEIGHT: 136 LBS | BODY MASS INDEX: 25.7 KG/M2 | DIASTOLIC BLOOD PRESSURE: 68 MMHG

## 2021-08-04 LAB
BILIRUB UR QL STRIP: NORMAL
GLUCOSE UR-MCNC: NORMAL
HCG UR QL: 0.2 EU/DL
HGB UR QL STRIP.AUTO: ABNORMAL
KETONES UR-MCNC: NORMAL
LEUKOCYTE ESTERASE UR QL STRIP: NORMAL
NITRITE UR QL STRIP: NORMAL
PH UR STRIP: 6
PROT UR STRIP-MCNC: NORMAL
SP GR UR STRIP: 1.02

## 2021-08-04 PROCEDURE — 0502F SUBSEQUENT PRENATAL CARE: CPT

## 2021-08-04 PROCEDURE — 76816 OB US FOLLOW-UP PER FETUS: CPT

## 2021-08-04 PROCEDURE — 90471 IMMUNIZATION ADMIN: CPT

## 2021-08-04 PROCEDURE — 90715 TDAP VACCINE 7 YRS/> IM: CPT

## 2021-08-18 ENCOUNTER — APPOINTMENT (OUTPATIENT)
Dept: OBGYN | Facility: CLINIC | Age: 41
End: 2021-08-18

## 2021-08-18 VITALS
SYSTOLIC BLOOD PRESSURE: 110 MMHG | DIASTOLIC BLOOD PRESSURE: 60 MMHG | HEIGHT: 61 IN | WEIGHT: 138.38 LBS | BODY MASS INDEX: 26.13 KG/M2 | TEMPERATURE: 95.6 F

## 2021-08-18 LAB
BILIRUB UR QL STRIP: NORMAL
DATE COLLECTED: NORMAL
GLUCOSE UR-MCNC: NORMAL
HCG UR QL: 1 EU/DL
HEMOCCULT SP1 STL QL: NEGATIVE
HGB UR QL STRIP.AUTO: ABNORMAL
KETONES UR-MCNC: ABNORMAL
LEUKOCYTE ESTERASE UR QL STRIP: NORMAL
NITRITE UR QL STRIP: NORMAL
PH UR STRIP: 6
PROT UR STRIP-MCNC: NORMAL
SP GR UR STRIP: 1.02

## 2021-08-20 LAB
APPEARANCE: ABNORMAL
BACTERIA UR CULT: NORMAL
BACTERIA: ABNORMAL
BILIRUBIN URINE: NEGATIVE
BLOOD URINE: NEGATIVE
CALCIUM OXALATE CRYSTALS: ABNORMAL
CANDIDA VAG CYTO: NOT DETECTED
COLOR: YELLOW
FIBRONECTIN PLAS-MCNC: NEGATIVE
G VAGINALIS+PREV SP MTYP VAG QL MICRO: NOT DETECTED
GLUCOSE QUALITATIVE U: NEGATIVE
HYALINE CASTS: 25 /LPF
KETONES URINE: NEGATIVE
LEUKOCYTE ESTERASE URINE: ABNORMAL
MICROSCOPIC-UA: NORMAL
NITRITE URINE: NEGATIVE
PH URINE: 6
PROTEIN URINE: ABNORMAL
RED BLOOD CELLS URINE: 1 /HPF
SPECIFIC GRAVITY URINE: 1.02
SQUAMOUS EPITHELIAL CELLS: 19 /HPF
T VAGINALIS VAG QL WET PREP: NOT DETECTED
UROBILINOGEN URINE: ABNORMAL
WHITE BLOOD CELLS URINE: 32 /HPF

## 2021-08-31 ENCOUNTER — NON-APPOINTMENT (OUTPATIENT)
Age: 41
End: 2021-08-31

## 2021-08-31 ENCOUNTER — ASOB RESULT (OUTPATIENT)
Age: 41
End: 2021-08-31

## 2021-08-31 ENCOUNTER — APPOINTMENT (OUTPATIENT)
Dept: OBGYN | Facility: CLINIC | Age: 41
End: 2021-08-31
Payer: COMMERCIAL

## 2021-08-31 PROCEDURE — 0502F SUBSEQUENT PRENATAL CARE: CPT

## 2021-08-31 PROCEDURE — 76818 FETAL BIOPHYS PROFILE W/NST: CPT

## 2021-08-31 PROCEDURE — 76816 OB US FOLLOW-UP PER FETUS: CPT | Mod: 59

## 2021-09-08 ENCOUNTER — NON-APPOINTMENT (OUTPATIENT)
Age: 41
End: 2021-09-08

## 2021-09-08 ENCOUNTER — ASOB RESULT (OUTPATIENT)
Age: 41
End: 2021-09-08

## 2021-09-08 ENCOUNTER — APPOINTMENT (OUTPATIENT)
Dept: OBGYN | Facility: CLINIC | Age: 41
End: 2021-09-08
Payer: COMMERCIAL

## 2021-09-08 VITALS
HEIGHT: 61 IN | SYSTOLIC BLOOD PRESSURE: 110 MMHG | WEIGHT: 141 LBS | BODY MASS INDEX: 26.62 KG/M2 | DIASTOLIC BLOOD PRESSURE: 60 MMHG

## 2021-09-08 PROCEDURE — 76818 FETAL BIOPHYS PROFILE W/NST: CPT

## 2021-09-08 PROCEDURE — 0502F SUBSEQUENT PRENATAL CARE: CPT

## 2021-09-09 LAB
BASOPHILS # BLD AUTO: 0.04 K/UL
BASOPHILS NFR BLD AUTO: 0.5 %
EOSINOPHIL # BLD AUTO: 0.35 K/UL
EOSINOPHIL NFR BLD AUTO: 4.3 %
HCT VFR BLD CALC: 38.1 %
HGB BLD-MCNC: 12.4 G/DL
HIV1+2 AB SPEC QL IA.RAPID: NONREACTIVE
IMM GRANULOCYTES NFR BLD AUTO: 2.5 %
LYMPHOCYTES # BLD AUTO: 1.85 K/UL
LYMPHOCYTES NFR BLD AUTO: 22.7 %
MAN DIFF?: NORMAL
MCHC RBC-ENTMCNC: 31.8 PG
MCHC RBC-ENTMCNC: 32.5 GM/DL
MCV RBC AUTO: 97.7 FL
MONOCYTES # BLD AUTO: 0.85 K/UL
MONOCYTES NFR BLD AUTO: 10.4 %
NEUTROPHILS # BLD AUTO: 4.87 K/UL
NEUTROPHILS NFR BLD AUTO: 59.6 %
PLATELET # BLD AUTO: 270 K/UL
RBC # BLD: 3.9 M/UL
RBC # FLD: 14.6 %
T PALLIDUM AB SER QL IA: NEGATIVE
WBC # FLD AUTO: 8.16 K/UL

## 2021-09-11 LAB — B-HEM STREP SPEC QL CULT: NORMAL

## 2021-09-14 ENCOUNTER — INPATIENT (INPATIENT)
Facility: HOSPITAL | Age: 41
LOS: 3 days | Discharge: HOME | End: 2021-09-18
Attending: OBSTETRICS & GYNECOLOGY | Admitting: OBSTETRICS & GYNECOLOGY
Payer: COMMERCIAL

## 2021-09-14 ENCOUNTER — TRANSCRIPTION ENCOUNTER (OUTPATIENT)
Age: 41
End: 2021-09-14

## 2021-09-14 ENCOUNTER — OUTPATIENT (OUTPATIENT)
Dept: OUTPATIENT SERVICES | Facility: HOSPITAL | Age: 41
LOS: 1 days | End: 2021-09-14
Payer: COMMERCIAL

## 2021-09-14 VITALS
DIASTOLIC BLOOD PRESSURE: 68 MMHG | HEART RATE: 84 BPM | TEMPERATURE: 98 F | RESPIRATION RATE: 20 BRPM | HEIGHT: 61 IN | WEIGHT: 108.03 LBS | SYSTOLIC BLOOD PRESSURE: 123 MMHG

## 2021-09-14 VITALS — DIASTOLIC BLOOD PRESSURE: 79 MMHG | HEART RATE: 95 BPM | SYSTOLIC BLOOD PRESSURE: 115 MMHG

## 2021-09-14 DIAGNOSIS — O34.219 MATERNAL CARE FOR UNSPECIFIED TYPE SCAR FROM PREVIOUS CESAREAN DELIVERY: ICD-10-CM

## 2021-09-14 DIAGNOSIS — Z01.818 ENCOUNTER FOR OTHER PREPROCEDURAL EXAMINATION: ICD-10-CM

## 2021-09-14 DIAGNOSIS — Z98.891 HISTORY OF UTERINE SCAR FROM PREVIOUS SURGERY: Chronic | ICD-10-CM

## 2021-09-14 DIAGNOSIS — O47.1 FALSE LABOR AT OR AFTER 37 COMPLETED WEEKS OF GESTATION: ICD-10-CM

## 2021-09-14 LAB
ANION GAP SERPL CALC-SCNC: 15 MMOL/L — SIGNIFICANT CHANGE UP (ref 5–17)
APPEARANCE UR: CLEAR — SIGNIFICANT CHANGE UP
BASOPHILS # BLD AUTO: 0.03 K/UL — SIGNIFICANT CHANGE UP (ref 0–0.2)
BASOPHILS NFR BLD AUTO: 0.4 % — SIGNIFICANT CHANGE UP (ref 0–2)
BILIRUB UR-MCNC: NEGATIVE — SIGNIFICANT CHANGE UP
BLD GP AB SCN SERPL QL: SIGNIFICANT CHANGE UP
BUN SERPL-MCNC: 8.6 MG/DL — SIGNIFICANT CHANGE UP (ref 8–20)
CALCIUM SERPL-MCNC: 8.8 MG/DL — SIGNIFICANT CHANGE UP (ref 8.6–10.2)
CHLORIDE SERPL-SCNC: 100 MMOL/L — SIGNIFICANT CHANGE UP (ref 98–107)
CO2 SERPL-SCNC: 21 MMOL/L — LOW (ref 22–29)
COLOR SPEC: YELLOW — SIGNIFICANT CHANGE UP
CREAT SERPL-MCNC: 0.34 MG/DL — LOW (ref 0.5–1.3)
DIFF PNL FLD: NEGATIVE — SIGNIFICANT CHANGE UP
EOSINOPHIL # BLD AUTO: 0.29 K/UL — SIGNIFICANT CHANGE UP (ref 0–0.5)
EOSINOPHIL NFR BLD AUTO: 4 % — SIGNIFICANT CHANGE UP (ref 0–6)
GLUCOSE SERPL-MCNC: 74 MG/DL — SIGNIFICANT CHANGE UP (ref 70–99)
GLUCOSE UR QL: NEGATIVE MG/DL — SIGNIFICANT CHANGE UP
HCT VFR BLD CALC: 36.8 % — SIGNIFICANT CHANGE UP (ref 34.5–45)
HGB BLD-MCNC: 12.2 G/DL — SIGNIFICANT CHANGE UP (ref 11.5–15.5)
IMM GRANULOCYTES NFR BLD AUTO: 1.5 % — SIGNIFICANT CHANGE UP (ref 0–1.5)
KETONES UR-MCNC: NEGATIVE — SIGNIFICANT CHANGE UP
LEUKOCYTE ESTERASE UR-ACNC: NEGATIVE — SIGNIFICANT CHANGE UP
LYMPHOCYTES # BLD AUTO: 1.14 K/UL — SIGNIFICANT CHANGE UP (ref 1–3.3)
LYMPHOCYTES # BLD AUTO: 15.9 % — SIGNIFICANT CHANGE UP (ref 13–44)
MCHC RBC-ENTMCNC: 31.9 PG — SIGNIFICANT CHANGE UP (ref 27–34)
MCHC RBC-ENTMCNC: 33.2 GM/DL — SIGNIFICANT CHANGE UP (ref 32–36)
MCV RBC AUTO: 96.3 FL — SIGNIFICANT CHANGE UP (ref 80–100)
MONOCYTES # BLD AUTO: 0.57 K/UL — SIGNIFICANT CHANGE UP (ref 0–0.9)
MONOCYTES NFR BLD AUTO: 7.9 % — SIGNIFICANT CHANGE UP (ref 2–14)
NEUTROPHILS # BLD AUTO: 5.03 K/UL — SIGNIFICANT CHANGE UP (ref 1.8–7.4)
NEUTROPHILS NFR BLD AUTO: 70.3 % — SIGNIFICANT CHANGE UP (ref 43–77)
NITRITE UR-MCNC: NEGATIVE — SIGNIFICANT CHANGE UP
PH UR: 7 — SIGNIFICANT CHANGE UP (ref 5–8)
PLATELET # BLD AUTO: 224 K/UL — SIGNIFICANT CHANGE UP (ref 150–400)
POTASSIUM SERPL-MCNC: 3.9 MMOL/L — SIGNIFICANT CHANGE UP (ref 3.5–5.3)
POTASSIUM SERPL-SCNC: 3.9 MMOL/L — SIGNIFICANT CHANGE UP (ref 3.5–5.3)
PROT UR-MCNC: NEGATIVE MG/DL — SIGNIFICANT CHANGE UP
RBC # BLD: 3.82 M/UL — SIGNIFICANT CHANGE UP (ref 3.8–5.2)
RBC # FLD: 14.3 % — SIGNIFICANT CHANGE UP (ref 10.3–14.5)
SODIUM SERPL-SCNC: 136 MMOL/L — SIGNIFICANT CHANGE UP (ref 135–145)
SP GR SPEC: 1 — LOW (ref 1.01–1.02)
UROBILINOGEN FLD QL: NEGATIVE MG/DL — SIGNIFICANT CHANGE UP
WBC # BLD: 7.17 K/UL — SIGNIFICANT CHANGE UP (ref 3.8–10.5)
WBC # FLD AUTO: 7.17 K/UL — SIGNIFICANT CHANGE UP (ref 3.8–10.5)

## 2021-09-14 PROCEDURE — G0463: CPT

## 2021-09-14 RX ORDER — FAMOTIDINE 10 MG/ML
20 INJECTION INTRAVENOUS ONCE
Refills: 0 | Status: COMPLETED | OUTPATIENT
Start: 2021-09-14 | End: 2021-09-15

## 2021-09-14 RX ORDER — CEFAZOLIN SODIUM 1 G
2000 VIAL (EA) INJECTION ONCE
Refills: 0 | Status: COMPLETED | OUTPATIENT
Start: 2021-09-14 | End: 2021-09-14

## 2021-09-14 RX ORDER — SODIUM CHLORIDE 9 MG/ML
1000 INJECTION, SOLUTION INTRAVENOUS
Refills: 0 | Status: DISCONTINUED | OUTPATIENT
Start: 2021-09-14 | End: 2021-09-15

## 2021-09-14 RX ORDER — SODIUM CHLORIDE 9 MG/ML
1000 INJECTION, SOLUTION INTRAVENOUS ONCE
Refills: 0 | Status: DISCONTINUED | OUTPATIENT
Start: 2021-09-14 | End: 2021-09-15

## 2021-09-14 RX ORDER — CITRIC ACID/SODIUM CITRATE 300-500 MG
30 SOLUTION, ORAL ORAL ONCE
Refills: 0 | Status: COMPLETED | OUTPATIENT
Start: 2021-09-14 | End: 2021-09-15

## 2021-09-14 RX ORDER — OXYTOCIN 10 UNIT/ML
333.33 VIAL (ML) INJECTION
Qty: 20 | Refills: 0 | Status: DISCONTINUED | OUTPATIENT
Start: 2021-09-14 | End: 2021-09-18

## 2021-09-14 RX ADMIN — SODIUM CHLORIDE 125 MILLILITER(S): 9 INJECTION, SOLUTION INTRAVENOUS at 23:36

## 2021-09-14 NOTE — OB RN PATIENT PROFILE - BRADEN SCORE (IF 18 OR LESS ACTIVATE SKIN INJURY RISK INCREASED GUIDELINE), MLM
Render Post-Care Instructions In Note?: yes Size Of Lesion In Cm: 0 Bill [id number] For Specimen Handling/Conveyance To Laboratory?: no Cryotherapy Text: The wound bed was treated with cryotherapy after the biopsy was performed. Wound Care: Vaseline Lab Facility: [id number] Anesthesia Type: 1% lidocaine with epinephrine Anesthesia Volume In Cc (Will Not Render If 0): 1 Type Of Destruction Used: Electrodesiccation Electrodesiccation And Curettage Text: The wound bed was treated with electrodesiccation and curettage after the biopsy was performed. Biopsy Type: H and E Billing Type: Third-Party Bill Depth Of Biopsy: dermis Electrodesiccation Text: The wound bed was treated with electrodesiccation after the biopsy was performed. Silver Nitrate Text: The wound bed was treated with silver nitrate after the biopsy was performed. Hemostasis: Drysol Post-Care Instructions: I reviewed with the patient in detail post-care instructions. Patient is to keep the biopsy site dry overnight, and then apply vaseline twice daily until healed. Dressing: pressure dressing with telfa Consent: Verbal consent was obtained and risks were reviewed including but not limited to scarring, infection, bleeding, scabbing, incomplete removal, nerve damage and allergy to anesthesia. Lab: [id number] Notification Instructions: Patient will be notified of biopsy results. However, patient instructed to call the office if not contacted within 2 weeks. Detail Level: Detailed Biopsy Method: Dermablade 23

## 2021-09-14 NOTE — OB RN PATIENT PROFILE - PRO PRENATAL LABS ORI SOURCE RUBELLA
"Kathryn Fay, RN, BSN         "
hard copy, drawn during this pregnancy

## 2021-09-14 NOTE — OB PROVIDER H&P - HISTORY OF PRESENT ILLNESS
none
42 y/o  at 37 weeks presents with rupture of membranes and labor.  She is anurag every 2min, clear fluid.  Pregnancy uncomplicated.  AMA - NIPT negative.      PMH: hypothyroid  Meds: synthroid   ALL: NKDA  PSH:  sections - , 2016 - laparoscopic surgery for ectopic pregnancy (salpingostomy?)   GYN hx:  +HPV, denies fibroids, STDs - hx ovarian cysts managed expectantly   OB hx:  2014: FT csection, GDM - induced, c/s for NRFHT,  2016 - repeat scheduled term csection, ectopics x2

## 2021-09-14 NOTE — OB PROVIDER H&P - NSHPPHYSICALEXAM_GEN_ALL_CORE
T(C): 36.8 (14 Sep 2021 23:11), Max: 36.8 (14 Sep 2021 10:47)  T(F): 98.2 (14 Sep 2021 23:11), Max: 98.24 (14 Sep 2021 23:01)  HR: 95 (14 Sep 2021 23:11) (84 - 95)  BP: 115/79 (14 Sep 2021 23:11) (115/79 - 123/68)  RR: 18 (14 Sep 2021 23:11) (18 - 20)    NAD  CV: RR +S1/S2  Resp: lungs CTA bilaterally  abd: gravid, soft no rebound/guarding  SSE: clear fluid, nonmalodorous  VE: 1/50/-2 soft   TOCO:  ctx q2min  FHT: category I

## 2021-09-15 ENCOUNTER — NON-APPOINTMENT (OUTPATIENT)
Age: 41
End: 2021-09-15

## 2021-09-15 ENCOUNTER — APPOINTMENT (OUTPATIENT)
Dept: OBGYN | Facility: CLINIC | Age: 41
End: 2021-09-15

## 2021-09-15 ENCOUNTER — RESULT REVIEW (OUTPATIENT)
Age: 41
End: 2021-09-15

## 2021-09-15 PROBLEM — E03.9 HYPOTHYROIDISM, UNSPECIFIED: Chronic | Status: ACTIVE | Noted: 2021-09-14

## 2021-09-15 PROBLEM — O24.419 GESTATIONAL DIABETES MELLITUS IN PREGNANCY, UNSPECIFIED CONTROL: Chronic | Status: ACTIVE | Noted: 2021-09-14

## 2021-09-15 LAB
BASOPHILS # BLD AUTO: 0.02 K/UL — SIGNIFICANT CHANGE UP (ref 0–0.2)
BASOPHILS NFR BLD AUTO: 0.2 % — SIGNIFICANT CHANGE UP (ref 0–2)
BLD GP AB SCN SERPL QL: SIGNIFICANT CHANGE UP
COVID-19 SPIKE DOMAIN AB INTERP: NEGATIVE — SIGNIFICANT CHANGE UP
COVID-19 SPIKE DOMAIN ANTIBODY RESULT: 0.4 U/ML — SIGNIFICANT CHANGE UP
EOSINOPHIL # BLD AUTO: 0.09 K/UL — SIGNIFICANT CHANGE UP (ref 0–0.5)
EOSINOPHIL NFR BLD AUTO: 0.8 % — SIGNIFICANT CHANGE UP (ref 0–6)
HCT VFR BLD CALC: 30.2 % — LOW (ref 34.5–45)
HGB BLD-MCNC: 10.2 G/DL — LOW (ref 11.5–15.5)
IMM GRANULOCYTES NFR BLD AUTO: 1.2 % — SIGNIFICANT CHANGE UP (ref 0–1.5)
LYMPHOCYTES # BLD AUTO: 18 % — SIGNIFICANT CHANGE UP (ref 13–44)
LYMPHOCYTES # BLD AUTO: 2.07 K/UL — SIGNIFICANT CHANGE UP (ref 1–3.3)
MCHC RBC-ENTMCNC: 32.3 PG — SIGNIFICANT CHANGE UP (ref 27–34)
MCHC RBC-ENTMCNC: 33.8 GM/DL — SIGNIFICANT CHANGE UP (ref 32–36)
MCV RBC AUTO: 95.6 FL — SIGNIFICANT CHANGE UP (ref 80–100)
MONOCYTES # BLD AUTO: 1.05 K/UL — HIGH (ref 0–0.9)
MONOCYTES NFR BLD AUTO: 9.1 % — SIGNIFICANT CHANGE UP (ref 2–14)
NEUTROPHILS # BLD AUTO: 8.11 K/UL — HIGH (ref 1.8–7.4)
NEUTROPHILS NFR BLD AUTO: 70.7 % — SIGNIFICANT CHANGE UP (ref 43–77)
PLATELET # BLD AUTO: 189 K/UL — SIGNIFICANT CHANGE UP (ref 150–400)
RBC # BLD: 3.16 M/UL — LOW (ref 3.8–5.2)
RBC # FLD: 14.4 % — SIGNIFICANT CHANGE UP (ref 10.3–14.5)
SARS-COV-2 IGG+IGM SERPL QL IA: 0.4 U/ML — SIGNIFICANT CHANGE UP
SARS-COV-2 IGG+IGM SERPL QL IA: NEGATIVE — SIGNIFICANT CHANGE UP
SARS-COV-2 RNA SPEC QL NAA+PROBE: SIGNIFICANT CHANGE UP
T PALLIDUM AB TITR SER: NEGATIVE — SIGNIFICANT CHANGE UP
WBC # BLD: 11.48 K/UL — HIGH (ref 3.8–10.5)
WBC # FLD AUTO: 11.48 K/UL — HIGH (ref 3.8–10.5)

## 2021-09-15 PROCEDURE — 58611 LIGATE OVIDUCT(S) ADD-ON: CPT | Mod: 59

## 2021-09-15 PROCEDURE — 59510 CESAREAN DELIVERY: CPT

## 2021-09-15 PROCEDURE — 88302 TISSUE EXAM BY PATHOLOGIST: CPT | Mod: 26

## 2021-09-15 RX ORDER — IBUPROFEN 200 MG
600 TABLET ORAL EVERY 6 HOURS
Refills: 0 | Status: DISCONTINUED | OUTPATIENT
Start: 2021-09-15 | End: 2021-09-18

## 2021-09-15 RX ORDER — ONDANSETRON 8 MG/1
4 TABLET, FILM COATED ORAL EVERY 6 HOURS
Refills: 0 | Status: DISCONTINUED | OUTPATIENT
Start: 2021-09-15 | End: 2021-09-18

## 2021-09-15 RX ORDER — DIPHENHYDRAMINE HCL 50 MG
25 CAPSULE ORAL EVERY 6 HOURS
Refills: 0 | Status: DISCONTINUED | OUTPATIENT
Start: 2021-09-15 | End: 2021-09-18

## 2021-09-15 RX ORDER — DEXAMETHASONE 0.5 MG/5ML
4 ELIXIR ORAL EVERY 6 HOURS
Refills: 0 | Status: DISCONTINUED | OUTPATIENT
Start: 2021-09-15 | End: 2021-09-18

## 2021-09-15 RX ORDER — SODIUM CHLORIDE 9 MG/ML
1000 INJECTION, SOLUTION INTRAVENOUS
Refills: 0 | Status: DISCONTINUED | OUTPATIENT
Start: 2021-09-15 | End: 2021-09-18

## 2021-09-15 RX ORDER — ACETAMINOPHEN 500 MG
975 TABLET ORAL
Refills: 0 | Status: DISCONTINUED | OUTPATIENT
Start: 2021-09-15 | End: 2021-09-18

## 2021-09-15 RX ORDER — OXYCODONE HYDROCHLORIDE 5 MG/1
5 TABLET ORAL ONCE
Refills: 0 | Status: DISCONTINUED | OUTPATIENT
Start: 2021-09-15 | End: 2021-09-18

## 2021-09-15 RX ORDER — IBUPROFEN 200 MG
600 TABLET ORAL EVERY 6 HOURS
Refills: 0 | Status: COMPLETED | OUTPATIENT
Start: 2021-09-15 | End: 2022-08-14

## 2021-09-15 RX ORDER — DIPHENHYDRAMINE HCL 50 MG
25 CAPSULE ORAL EVERY 4 HOURS
Refills: 0 | Status: DISCONTINUED | OUTPATIENT
Start: 2021-09-15 | End: 2021-09-18

## 2021-09-15 RX ORDER — OXYCODONE HYDROCHLORIDE 5 MG/1
5 TABLET ORAL
Refills: 0 | Status: COMPLETED | OUTPATIENT
Start: 2021-09-15 | End: 2021-09-22

## 2021-09-15 RX ORDER — LEVOTHYROXINE SODIUM 125 MCG
75 TABLET ORAL DAILY
Refills: 0 | Status: DISCONTINUED | OUTPATIENT
Start: 2021-09-15 | End: 2021-09-18

## 2021-09-15 RX ORDER — TRANEXAMIC ACID 100 MG/ML
1000 INJECTION, SOLUTION INTRAVENOUS ONCE
Refills: 0 | Status: COMPLETED | OUTPATIENT
Start: 2021-09-15 | End: 2021-09-15

## 2021-09-15 RX ORDER — SIMETHICONE 80 MG/1
80 TABLET, CHEWABLE ORAL EVERY 4 HOURS
Refills: 0 | Status: DISCONTINUED | OUTPATIENT
Start: 2021-09-15 | End: 2021-09-18

## 2021-09-15 RX ORDER — KETOROLAC TROMETHAMINE 30 MG/ML
30 SYRINGE (ML) INJECTION EVERY 6 HOURS
Refills: 0 | Status: DISCONTINUED | OUTPATIENT
Start: 2021-09-15 | End: 2021-09-15

## 2021-09-15 RX ORDER — MAGNESIUM HYDROXIDE 400 MG/1
30 TABLET, CHEWABLE ORAL
Refills: 0 | Status: DISCONTINUED | OUTPATIENT
Start: 2021-09-15 | End: 2021-09-18

## 2021-09-15 RX ORDER — LANOLIN
1 OINTMENT (GRAM) TOPICAL EVERY 6 HOURS
Refills: 0 | Status: DISCONTINUED | OUTPATIENT
Start: 2021-09-15 | End: 2021-09-18

## 2021-09-15 RX ORDER — NALOXONE HYDROCHLORIDE 4 MG/.1ML
0.1 SPRAY NASAL
Refills: 0 | Status: DISCONTINUED | OUTPATIENT
Start: 2021-09-15 | End: 2021-09-18

## 2021-09-15 RX ORDER — AZITHROMYCIN 500 MG/1
500 TABLET, FILM COATED ORAL ONCE
Refills: 0 | Status: COMPLETED | OUTPATIENT
Start: 2021-09-15 | End: 2021-09-15

## 2021-09-15 RX ORDER — ENOXAPARIN SODIUM 100 MG/ML
40 INJECTION SUBCUTANEOUS EVERY 24 HOURS
Refills: 0 | Status: COMPLETED | OUTPATIENT
Start: 2021-09-15 | End: 2021-09-17

## 2021-09-15 RX ORDER — TETANUS TOXOID, REDUCED DIPHTHERIA TOXOID AND ACELLULAR PERTUSSIS VACCINE, ADSORBED 5; 2.5; 8; 8; 2.5 [IU]/.5ML; [IU]/.5ML; UG/.5ML; UG/.5ML; UG/.5ML
0.5 SUSPENSION INTRAMUSCULAR ONCE
Refills: 0 | Status: DISCONTINUED | OUTPATIENT
Start: 2021-09-15 | End: 2021-09-18

## 2021-09-15 RX ORDER — OXYTOCIN 10 UNIT/ML
333.33 VIAL (ML) INJECTION
Qty: 20 | Refills: 0 | Status: DISCONTINUED | OUTPATIENT
Start: 2021-09-15 | End: 2021-09-18

## 2021-09-15 RX ADMIN — Medication 975 MILLIGRAM(S): at 18:40

## 2021-09-15 RX ADMIN — Medication 975 MILLIGRAM(S): at 10:39

## 2021-09-15 RX ADMIN — Medication 30 MILLIGRAM(S): at 09:38

## 2021-09-15 RX ADMIN — Medication 75 MICROGRAM(S): at 06:33

## 2021-09-15 RX ADMIN — ENOXAPARIN SODIUM 40 MILLIGRAM(S): 100 INJECTION SUBCUTANEOUS at 16:00

## 2021-09-15 RX ADMIN — Medication 30 MILLILITER(S): at 00:41

## 2021-09-15 RX ADMIN — Medication 30 MILLIGRAM(S): at 04:39

## 2021-09-15 RX ADMIN — Medication 1000 MILLIUNIT(S)/MIN: at 01:54

## 2021-09-15 RX ADMIN — Medication 975 MILLIGRAM(S): at 23:35

## 2021-09-15 RX ADMIN — Medication 30 MILLIGRAM(S): at 15:25

## 2021-09-15 RX ADMIN — Medication 30 MILLIGRAM(S): at 15:10

## 2021-09-15 RX ADMIN — Medication 30 MILLIGRAM(S): at 20:28

## 2021-09-15 RX ADMIN — FAMOTIDINE 20 MILLIGRAM(S): 10 INJECTION INTRAVENOUS at 00:41

## 2021-09-15 RX ADMIN — SODIUM CHLORIDE 125 MILLILITER(S): 9 INJECTION, SOLUTION INTRAVENOUS at 00:42

## 2021-09-15 RX ADMIN — Medication 975 MILLIGRAM(S): at 11:30

## 2021-09-15 RX ADMIN — AZITHROMYCIN 255 MILLIGRAM(S): 500 TABLET, FILM COATED ORAL at 00:49

## 2021-09-15 RX ADMIN — Medication 975 MILLIGRAM(S): at 19:17

## 2021-09-15 RX ADMIN — Medication 30 MILLIGRAM(S): at 07:15

## 2021-09-15 RX ADMIN — Medication 30 MILLIGRAM(S): at 10:00

## 2021-09-15 RX ADMIN — Medication 100 MILLIGRAM(S): at 01:25

## 2021-09-15 RX ADMIN — Medication 30 MILLIGRAM(S): at 20:42

## 2021-09-15 NOTE — OB RN DELIVERY SUMMARY - NS_SEPSISRSKCALC_OBGYN_ALL_OB_FT
EOS calculated successfully. EOS Risk Factor: 0.5/1000 live births ([facility] national incidence); GA=37w1d; Temp=98.24; ROM=4.367; GBS='Negative'; Antibiotics='No antibiotics or any antibiotics < 2 hrs prior to birth'

## 2021-09-15 NOTE — OB PROVIDER DELIVERY SUMMARY - NSPROVIDERDELIVERYNOTE_OBGYN_ALL_OB_FT
Pre-operative Diagnosis: IUP @ 37w1d, Repeat  section, SROM, Labor, Satisfied parity  Post-operative Diagnosis: IUP @ 37w1d, Repeat  section, SROM, Labor, Satisfied parity  Findings: Female infant, APGAR 9/9, Weight 2820g, Grossly normal appearing uterus, fallopian tubes and ovaries, bilaterally  EBL: 305cc  Anesthesia: Spinal        Patient was taken to the OR, a repeat low transverse  section was performed and a viable female infant was delivered atraumatically in cephalic presentation at 0152. No nuchal cord noted. Placenta manually delivered. Hysterotomy repaired in a running locked fashion using 0-Monocryl CTX. Left fallopian tube grasped using peter and hemostat and suture ligated using vicryl suture. Similar procedure performed for right fallopian tube. Peritoneum and muscles reapproximated in a running fashion. Fascia reapproximated using 1-0 vicryl in a running fashion. Subcutaneous layer reapproximated in an interrupted fashion and skin closed in a subcuticular fashion using 3-0 monocryl Pre-operative Diagnosis: IUP @ 37w1d, Repeat  section, SROM, Labor, Satisfied parity  Post-operative Diagnosis: IUP @ 37w1d, Repeat  section, SROM, Labor, Satisfied parity  Findings: Female infant, APGAR 9/9, Weight 2820g, Grossly normal appearing uterus, fallopian tubes and ovaries, bilaterally  EBL: 305cc  Anesthesia: Spinal    Patient was taken to the OR, a repeat low transverse  section was performed and a viable female infant was delivered atraumatically in cephalic presentation at 0152. No nuchal cord noted. Placenta manually delivered. Hysterotomy repaired in a running locked fashion using 0-Monocryl CTX. Left fallopian tube grasped using peter and hemostat and suture ligated using vicryl suture. Similar procedure performed for right fallopian tube. Peritoneum and muscles reapproximated in a running fashion. Fascia reapproximated using 1-0 vicryl in a running fashion. Subcutaneous layer reapproximated in an interrupted fashion and skin closed in a subcuticular fashion using 3-0 monocryl    OB attending:  resident note reviewed, uncomplicated repeat csection - very thin lower uterine segment, otherwise normal uterus tubes and ovaries, bilateral salpingectomy performed  Jillian Stanton MD

## 2021-09-15 NOTE — OB NEONATOLOGY/PEDIATRICIAN DELIVERY SUMMARY - NSPEDSNEONOTESA_OBGYN_ALL_OB_FT
BABY GIRL ENRIQUE is a 37.1 wk early term  born at 0152  on 9/15/21 via RC/S to 42 yo  O+/ GBS neg/ RI/ RPR NR/ HIV neg/ HepBSAg neg/ COVID-19 neg mom with good PNC. EDC 10/5/21.  Pregnancy complicated by AMA.  Mom on Synthroid for Hypothyroidism.  Previous C/S x 2.  L&D: Mom admitted with h/o ctxs and also SROM at 2130 [21]; 4 1/2 hPTD; afebrile.  C/S for RC/S in labor with SROM. C/s under regional anesthesia.  Upon delivery clear AF.  Spontaneous cry.  Routine care  AS .  A/P: Early term  delivered by C/S.  Observe for respiratory distress.  Transition to routine NB care under PMD/ Hospitalist service.  Levy Faria MD BABY GIRL ENRIQUE is a 37.1 wk early term  born at 0152  on 9/15/21 via RC/S to 39 yo  O+/ GBS neg/ RI/ RPR NR/ HIV neg/ HepBSAg neg/ COVID-19 neg mom with good PNC. EDC 10/5/21.  Pregnancy complicated by AMA.  Mom on Synthroid for Hypothyroidism.  Previous C/S x 2.  L&D: Mom admitted with h/o ctxs and also SROM at 2130 [21]; 4 1/2 hPTD; afebrile.  C/S for RC/S in labor with SROM. C/s under regional anesthesia.  Upon delivery clear AF.  Spontaneous cry.  Well-suctioned, dried and stimulated with good response.  Required O2 to keep O2 sats above 90%. IPPV via T-piece x 10 secs for alveolar recruitment, followed with CPAP with up to 60%. FiO2 weaned to achieve targeted O2 sats.  AS .  A/P: Early term  delivered by C/S.  Observe for respiratory distress.  Transition in SCN.  To transfer to Regular Nursery under Hospitalist care if infant remains stable, and not requiring respiratory support.  Levy Faria MD BABY GIRL ENRIQUE is a 37.1 wk early term  born at 0152  on 9/15/21 via RC/S to 42 yo  O+/ GBS neg/ RI/ RPR NR/ HIV neg/ HepBSAg neg/ COVID-19 neg mom with good PNC. EDC 10/5/21.  Pregnancy complicated by AMA.  Mom on Synthroid for Hypothyroidism.  Previous C/S x 2.  L&D: Mom admitted with h/o ctxs and also SROM at 2130 [21]; 4 1/2 hPTD; afebrile.  C/S for RC/S in labor with SROM. C/s under regional anesthesia.  Upon delivery clear AF.  Spontaneous cry.  Well-suctioned, dried and stimulated with good response.  Required O2 to keep O2 sats above 90%. IPPV via T-piece x 10 secs for alveolar recruitment, followed with CPAP with up to 60%. FiO2 weaned to achieve targeted O2 sats.  AS   A/P: Early term  delivered by C/S.  Observe for respiratory distress.  Transition in SCN.  To transfer to Regular Nursery under Hospitalist care if infant remains stable, and not requiring respiratory support.  Levy Faria MD

## 2021-09-15 NOTE — OB RN INTRAOPERATIVE NOTE - NSSELHIDDEN_OBGYN_ALL_OB_FT
[NS_DeliveryAttending1_OBGYN_ALL_OB_FT:[id number]=] [NS_DeliveryAttending1_OBGYN_ALL_OB_FT:[email or web address]=],[NS_DeliveryAssist1_OBGYN_ALL_OB_FT:[id number]=],[NS_DeliveryRN_OBGYN_ALL_OB_FT:[id number]=]

## 2021-09-16 ENCOUNTER — TRANSCRIPTION ENCOUNTER (OUTPATIENT)
Age: 41
End: 2021-09-16

## 2021-09-16 RX ORDER — OXYCODONE HYDROCHLORIDE 5 MG/1
5 TABLET ORAL
Refills: 0 | Status: DISCONTINUED | OUTPATIENT
Start: 2021-09-16 | End: 2021-09-18

## 2021-09-16 RX ORDER — OXYCODONE HYDROCHLORIDE 5 MG/1
1 TABLET ORAL
Qty: 6 | Refills: 0
Start: 2021-09-16

## 2021-09-16 RX ORDER — ASCORBIC ACID 60 MG
0 TABLET,CHEWABLE ORAL
Qty: 0 | Refills: 0 | DISCHARGE

## 2021-09-16 RX ORDER — ACETAMINOPHEN 500 MG
1 TABLET ORAL
Qty: 30 | Refills: 0
Start: 2021-09-16 | End: 2021-09-25

## 2021-09-16 RX ORDER — IBUPROFEN 200 MG
1 TABLET ORAL
Qty: 40 | Refills: 0
Start: 2021-09-16 | End: 2021-09-25

## 2021-09-16 RX ADMIN — Medication 600 MILLIGRAM(S): at 15:44

## 2021-09-16 RX ADMIN — Medication 975 MILLIGRAM(S): at 18:47

## 2021-09-16 RX ADMIN — OXYCODONE HYDROCHLORIDE 5 MILLIGRAM(S): 5 TABLET ORAL at 16:50

## 2021-09-16 RX ADMIN — SIMETHICONE 80 MILLIGRAM(S): 80 TABLET, CHEWABLE ORAL at 20:51

## 2021-09-16 RX ADMIN — Medication 600 MILLIGRAM(S): at 09:48

## 2021-09-16 RX ADMIN — Medication 600 MILLIGRAM(S): at 03:23

## 2021-09-16 RX ADMIN — Medication 600 MILLIGRAM(S): at 02:23

## 2021-09-16 RX ADMIN — Medication 975 MILLIGRAM(S): at 05:24

## 2021-09-16 RX ADMIN — Medication 975 MILLIGRAM(S): at 18:15

## 2021-09-16 RX ADMIN — Medication 600 MILLIGRAM(S): at 14:47

## 2021-09-16 RX ADMIN — Medication 600 MILLIGRAM(S): at 20:51

## 2021-09-16 RX ADMIN — ENOXAPARIN SODIUM 40 MILLIGRAM(S): 100 INJECTION SUBCUTANEOUS at 14:47

## 2021-09-16 RX ADMIN — Medication 975 MILLIGRAM(S): at 11:41

## 2021-09-16 RX ADMIN — Medication 975 MILLIGRAM(S): at 12:40

## 2021-09-16 RX ADMIN — Medication 975 MILLIGRAM(S): at 00:30

## 2021-09-16 RX ADMIN — OXYCODONE HYDROCHLORIDE 5 MILLIGRAM(S): 5 TABLET ORAL at 23:15

## 2021-09-16 RX ADMIN — OXYCODONE HYDROCHLORIDE 5 MILLIGRAM(S): 5 TABLET ORAL at 02:23

## 2021-09-16 RX ADMIN — OXYCODONE HYDROCHLORIDE 5 MILLIGRAM(S): 5 TABLET ORAL at 09:55

## 2021-09-16 RX ADMIN — Medication 975 MILLIGRAM(S): at 06:20

## 2021-09-16 RX ADMIN — OXYCODONE HYDROCHLORIDE 5 MILLIGRAM(S): 5 TABLET ORAL at 10:50

## 2021-09-16 RX ADMIN — Medication 75 MICROGRAM(S): at 05:24

## 2021-09-16 RX ADMIN — SIMETHICONE 80 MILLIGRAM(S): 80 TABLET, CHEWABLE ORAL at 16:51

## 2021-09-16 RX ADMIN — OXYCODONE HYDROCHLORIDE 5 MILLIGRAM(S): 5 TABLET ORAL at 14:10

## 2021-09-16 RX ADMIN — Medication 600 MILLIGRAM(S): at 08:48

## 2021-09-16 RX ADMIN — OXYCODONE HYDROCHLORIDE 5 MILLIGRAM(S): 5 TABLET ORAL at 13:12

## 2021-09-16 RX ADMIN — OXYCODONE HYDROCHLORIDE 5 MILLIGRAM(S): 5 TABLET ORAL at 01:41

## 2021-09-16 RX ADMIN — OXYCODONE HYDROCHLORIDE 5 MILLIGRAM(S): 5 TABLET ORAL at 17:50

## 2021-09-16 RX ADMIN — Medication 600 MILLIGRAM(S): at 21:45

## 2021-09-16 NOTE — DISCHARGE NOTE OB - HOSPITAL COURSE
Patient underwent a repeat  and BS for sterilization at 37w1d. Post-partum course was uncomplicated. Pain is well controlled with PRN medication. She has no difficulty with ambulation, voiding, or PO intake. Lab values and vital signs are within normal limits prior to discharge.

## 2021-09-16 NOTE — DISCHARGE NOTE OB - BREASTFEEDING PROVIDES STABLE TEMPERATURE THROUGH SKIN TO SKIN CONTACT
Statement Selected Carac Counseling:  I discussed with the patient the risks of Carac including but not limited to erythema, scaling, itching, weeping, crusting, and pain.

## 2021-09-16 NOTE — DISCHARGE NOTE OB - CARE PLAN
1 Principal Discharge DX:	 delivery delivered  Assessment and plan of treatment:	1) Please take ibuprofen and/or Tylenol as needed for pain as prescribed.  2) Nothing in the vagina for 6 weeks (including no sex, no tampons, and no douching).  3) Please call your doctor for a follow up your postpartum appointment in 2 weeks.  4) Please continue taking vitamins postpartum. Take iron and colace for acute blood loss anemia.  5) Please call the office sooner if you have heavy vaginal bleeding, severe abdominal pain, or fever > 100.4F.  6) You may resume regular daily activity as tolerated

## 2021-09-16 NOTE — DISCHARGE NOTE OB - PATIENT PORTAL LINK FT
You can access the FollowMyHealth Patient Portal offered by [facility] by registering at the following website: [email or web address]. By joining [facility]’s FollowMyHealth portal, you will also be able to view your health information using other applications (apps) compatible with our system.

## 2021-09-16 NOTE — DISCHARGE NOTE OB - CARE PROVIDER_API CALL
Shoaib Minaya (MD)  Obstetrics and Gynecology  [address] [place] [address]  Phone: [phone]  Fax: [phone]  Follow Up Time:

## 2021-09-17 LAB — SURGICAL PATHOLOGY STUDY: SIGNIFICANT CHANGE UP

## 2021-09-17 RX ADMIN — Medication 600 MILLIGRAM(S): at 09:40

## 2021-09-17 RX ADMIN — OXYCODONE HYDROCHLORIDE 5 MILLIGRAM(S): 5 TABLET ORAL at 22:24

## 2021-09-17 RX ADMIN — Medication 600 MILLIGRAM(S): at 08:58

## 2021-09-17 RX ADMIN — Medication 600 MILLIGRAM(S): at 16:16

## 2021-09-17 RX ADMIN — Medication 600 MILLIGRAM(S): at 21:21

## 2021-09-17 RX ADMIN — Medication 600 MILLIGRAM(S): at 02:01

## 2021-09-17 RX ADMIN — SIMETHICONE 80 MILLIGRAM(S): 80 TABLET, CHEWABLE ORAL at 02:01

## 2021-09-17 RX ADMIN — OXYCODONE HYDROCHLORIDE 5 MILLIGRAM(S): 5 TABLET ORAL at 11:15

## 2021-09-17 RX ADMIN — OXYCODONE HYDROCHLORIDE 5 MILLIGRAM(S): 5 TABLET ORAL at 07:30

## 2021-09-17 RX ADMIN — Medication 975 MILLIGRAM(S): at 13:32

## 2021-09-17 RX ADMIN — OXYCODONE HYDROCHLORIDE 5 MILLIGRAM(S): 5 TABLET ORAL at 16:16

## 2021-09-17 RX ADMIN — Medication 975 MILLIGRAM(S): at 05:02

## 2021-09-17 RX ADMIN — OXYCODONE HYDROCHLORIDE 5 MILLIGRAM(S): 5 TABLET ORAL at 10:29

## 2021-09-17 RX ADMIN — Medication 600 MILLIGRAM(S): at 03:00

## 2021-09-17 RX ADMIN — ENOXAPARIN SODIUM 40 MILLIGRAM(S): 100 INJECTION SUBCUTANEOUS at 14:15

## 2021-09-17 RX ADMIN — Medication 975 MILLIGRAM(S): at 23:48

## 2021-09-17 RX ADMIN — Medication 75 MICROGRAM(S): at 05:02

## 2021-09-17 RX ADMIN — Medication 975 MILLIGRAM(S): at 06:00

## 2021-09-17 RX ADMIN — Medication 975 MILLIGRAM(S): at 12:56

## 2021-09-17 RX ADMIN — OXYCODONE HYDROCHLORIDE 5 MILLIGRAM(S): 5 TABLET ORAL at 16:47

## 2021-09-17 RX ADMIN — Medication 600 MILLIGRAM(S): at 22:17

## 2021-09-17 RX ADMIN — OXYCODONE HYDROCHLORIDE 5 MILLIGRAM(S): 5 TABLET ORAL at 00:03

## 2021-09-17 RX ADMIN — OXYCODONE HYDROCHLORIDE 5 MILLIGRAM(S): 5 TABLET ORAL at 23:24

## 2021-09-17 RX ADMIN — Medication 600 MILLIGRAM(S): at 14:15

## 2021-09-17 RX ADMIN — Medication 975 MILLIGRAM(S): at 18:10

## 2021-09-17 RX ADMIN — OXYCODONE HYDROCHLORIDE 5 MILLIGRAM(S): 5 TABLET ORAL at 06:43

## 2021-09-17 NOTE — PROGRESS NOTE ADULT - ATTENDING COMMENTS
POD#1, baby in NICU. Patient doing well. Breastfeeding encouraged. Continue routine postpartum care
POD#2 from RCS, baby in NICU. Mom is doing well but is emotional. Will discharge on POD#3

## 2021-09-18 VITALS
OXYGEN SATURATION: 96 % | SYSTOLIC BLOOD PRESSURE: 103 MMHG | HEART RATE: 79 BPM | TEMPERATURE: 98 F | DIASTOLIC BLOOD PRESSURE: 53 MMHG | RESPIRATION RATE: 17 BRPM

## 2021-09-18 PROCEDURE — 86900 BLOOD TYPING SEROLOGIC ABO: CPT

## 2021-09-18 PROCEDURE — 36415 COLL VENOUS BLD VENIPUNCTURE: CPT

## 2021-09-18 PROCEDURE — 88302 TISSUE EXAM BY PATHOLOGIST: CPT

## 2021-09-18 PROCEDURE — 86923 COMPATIBILITY TEST ELECTRIC: CPT

## 2021-09-18 PROCEDURE — 86769 SARS-COV-2 COVID-19 ANTIBODY: CPT

## 2021-09-18 PROCEDURE — U0005: CPT

## 2021-09-18 PROCEDURE — 86850 RBC ANTIBODY SCREEN: CPT

## 2021-09-18 PROCEDURE — 86780 TREPONEMA PALLIDUM: CPT

## 2021-09-18 PROCEDURE — 86901 BLOOD TYPING SEROLOGIC RH(D): CPT

## 2021-09-18 PROCEDURE — U0003: CPT

## 2021-09-18 PROCEDURE — 85025 COMPLETE CBC W/AUTO DIFF WBC: CPT

## 2021-09-18 RX ADMIN — Medication 600 MILLIGRAM(S): at 15:54

## 2021-09-18 RX ADMIN — Medication 975 MILLIGRAM(S): at 06:23

## 2021-09-18 RX ADMIN — Medication 600 MILLIGRAM(S): at 09:30

## 2021-09-18 RX ADMIN — OXYCODONE HYDROCHLORIDE 5 MILLIGRAM(S): 5 TABLET ORAL at 05:12

## 2021-09-18 RX ADMIN — Medication 600 MILLIGRAM(S): at 03:28

## 2021-09-18 RX ADMIN — OXYCODONE HYDROCHLORIDE 5 MILLIGRAM(S): 5 TABLET ORAL at 10:29

## 2021-09-18 RX ADMIN — Medication 600 MILLIGRAM(S): at 04:28

## 2021-09-18 RX ADMIN — Medication 600 MILLIGRAM(S): at 08:53

## 2021-09-18 RX ADMIN — Medication 975 MILLIGRAM(S): at 12:41

## 2021-09-18 RX ADMIN — OXYCODONE HYDROCHLORIDE 5 MILLIGRAM(S): 5 TABLET ORAL at 11:00

## 2021-09-18 RX ADMIN — OXYCODONE HYDROCHLORIDE 5 MILLIGRAM(S): 5 TABLET ORAL at 06:12

## 2021-09-18 RX ADMIN — Medication 75 MICROGRAM(S): at 06:23

## 2021-09-18 RX ADMIN — Medication 975 MILLIGRAM(S): at 00:45

## 2021-09-18 RX ADMIN — Medication 975 MILLIGRAM(S): at 13:15

## 2021-09-18 NOTE — PROGRESS NOTE ADULT - SUBJECTIVE AND OBJECTIVE BOX
KATH NUNEZ is a 42 yo  now POD#3 s/p repeat  section and bilateral salpingectomy @ 37w1d 2/2 SROM/Labor.     - COVID: Negative  - RUBELLA: Immune    S:    Patient seen and examined at bedside this AM.  Patient emotional regarding infant remaining in NICU, but denies any symptoms of depression  Reports improvement in gas pain  Pain controlled with medication   She is ambulating without difficulty and tolerating PO. Denies any nausea or vomiting  + flatus/-BM   Using breast-pump without issue. Breast milk slowly coming in    O:    T(C): 36.7 (21 @ 03:30), Max: 36.7 (21 @ 03:30)  HR: 82 (21 @ 03:30) (80 - 82)  BP: 112/73 (21 @ 03:30) (108/72 - 112/73)  RR: 17 (21 @ 03:30) (16 - 17)  SpO2: 97% (21 @ 03:30) (97% - 97%)    PE:  CV: RRR  RESP: CTA b/l  Abdomen:  Softly distended, +bowel sounds. No rebound or guarding appreciated  Uterus:  Fundus firm below umbilicus  Incision: Steri strips in place.  Clean/dry/intact  VE:  +lochia  Ext:  Non-tender, no edema       
KATH NUNEZ is a 40 yo  now POD#1 s/p repeat  section and bilateral salpingectomy @ 37w1d 2/2 SROM/Labor.     - COVID: Negative  - RUBELLA: Immune    S:    She is doing well, has no complaints.   Pain controlled with medication   She is ambulating without difficulty and tolerating PO.   + flatus/-BM     O:    T(C): 36.4 (21 @ 05:15), Max: 36.9 (09-15-21 @ 19:35)  HR: 70 (21 @ 05:15) (60 - 75)  BP: 105/70 (21 @ 05:15) (97/58 - 113/70)  RR: 16 (21 @ 05:15) (16 - 18)  SpO2: 98% (21 @ 05:15) (96% - 98%)      Breast: Nontender, not engorged   CV: RRR  RESP: CTA b/l  Abdomen:  Soft, appropriately-tender, non-distended, +bowel sounds.  Uterus:  Fundus firm below umbilicus  Incision: Steri strips in place. Clean/dry/intact  VE:  +lochia  Ext:  Non-tender, no edema                           10.2   11.48 )-----------( 189      ( 15 Sep 2021 19:05 )             30.2         136  |  100  |  8.6  ----------------------------<  74  3.9   |  21.0<L>  |  0.34<L>    Ca    8.8      14 Sep 2021 11:44        
KATH NUNEZ is a 42 yo  now POD#2 s/p repeat  section and bilateral salpingectomy @ 37w1d 2/2 SROM/Labor.     - COVID: Negative  - RUBELLA: Immune    S:    Patient seen and examined at bedside this AM.  Reporting gas like pain that is moderately relieved with simethicone and ambulation  Pain controlled with medication   She is ambulating without difficulty and tolerating PO. Denies any nausea or vomiting  + flatus/-BM   Using breast-pump without issue. Producing colostrum.     O:    T(C): 36.9 (21 @ 04:45), Max: 36.9 (21 @ 04:45)  HR: 77 (21 @ 04:45) (69 - 77)  BP: 105/70 (21 @ 04:45) (105/70 - 115/77)  RR: 17 (21 @ 04:45) (16 - 17)  SpO2: 96% (21 @ 04:45) (96% - 98%)    PE:  Breast: Nontender, not engorged   CV: RRR  RESP: CTA b/l  Abdomen:  Softly distended, +bowel sounds. No rebound or guarding appreciated  Uterus:  Fundus firm below umbilicus  Incision: Steri strips in place.  Clean/dry/intact  VE:  +lochia  Ext:  Non-tender, no edema                           10.2   11.48 )-----------( 189      ( 15 Sep 2021 19:05 )             30.2         
Patient seen and evaluated at bedside.  She presents with ruptured membranes anurag every 2 minutes -- Dr. Stanton states that the patients needs to go to the operating room as soon as possible, despite the patients last oral intake was <8 hours prior due to the risk of uterine rupture > than the risk of pulmonary aspiration (in event of emergency airway manipulation needed).  This was discussed with the patient and all teams are in agreement to proceed with  section.

## 2021-09-18 NOTE — PROGRESS NOTE ADULT - ASSESSMENT
KATH NUNEZ is a 40 yo  now POD#2 s/p repeat  section and bilateral salpingectomy @ 37w1d 2/2 SROM/Labor. No acute overnight events    PLAN:  Patient feels well  Hgb: 12.2  --> F/U AM Labs  Hx of hypothyroidism; c/w standing synthroid 75mcg QD  Continue the current pain medication  Encourage  Ambulation  Encourage regular diet  DVT ppx: SCDs only when not ambulating + Lovenox 40mg QD  Continue routine postpartum care
KATH NUNEZ is a 42 yo  now POD#1 s/p repeat  section and bilateral salpingectomy @ 37w1d 2/2 SROM/Labor.     PLAN:  Patient feels well  Hgb: 12.2  --> F/U AM Labs  Hx of hypothyroidism; c/w standing synthroid 75mcg QD  Continue the current pain medication  Encourage  Ambulation  Encourage regular diet  DVT ppx: SCDs only when not ambulating + Lovenox 40mg QD  Continue routine postpartum care    
KATH NUNEZ is a 42 yo  now POD#3 s/p repeat  section and bilateral salpingectomy @ 37w1d 2/2 SROM/Labor. No acute overnight events    PLAN:  Patient feels well  Hgb: 12.2  --> 10.2  Hx of hypothyroidism; c/w standing synthroid 75mcg QD  Continue the current pain medication  Encourage  Ambulation  Encourage regular diet  DVT ppx: SCDs only when not ambulating + Lovenox 40mg QD  Seen by social work in order to establish supportive resources due to infant's NICU admission. No signs of suicidal ideation or self-harm  Female Infant in NICU   Continue routine postpartum care

## 2021-09-23 ENCOUNTER — APPOINTMENT (OUTPATIENT)
Dept: OBGYN | Facility: CLINIC | Age: 41
End: 2021-09-23
Payer: COMMERCIAL

## 2021-09-23 VITALS
SYSTOLIC BLOOD PRESSURE: 124 MMHG | WEIGHT: 129 LBS | DIASTOLIC BLOOD PRESSURE: 68 MMHG | HEIGHT: 61 IN | BODY MASS INDEX: 24.35 KG/M2

## 2021-09-23 LAB
1ST TRIMESTER DATA: NORMAL
2ND TRIMESTER DATA: NORMAL
AFP PNL SERPL: NORMAL
AFP SERPL-ACNC: NORMAL
AFP SERPL-ACNC: NORMAL
B-HCG FREE SERPL-MCNC: NORMAL
CLINICAL BIOCHEMIST REVIEW: NORMAL
FREE BETA HCG 1ST TRIMESTER: NORMAL
INHIBIN A SERPL-MCNC: NORMAL
NASAL BONE: PRESENT
NOTES NTD: NORMAL
NT: NORMAL
PAPP-A SERPL-ACNC: NORMAL
U ESTRIOL SERPL-SCNC: NORMAL

## 2021-09-23 PROCEDURE — 0503F POSTPARTUM CARE VISIT: CPT

## 2021-09-24 ENCOUNTER — APPOINTMENT (OUTPATIENT)
Dept: OBGYN | Facility: CLINIC | Age: 41
End: 2021-09-24

## 2021-09-25 ENCOUNTER — APPOINTMENT (OUTPATIENT)
Dept: DISASTER EMERGENCY | Facility: CLINIC | Age: 41
End: 2021-09-25

## 2021-09-26 NOTE — END OF VISIT
[FreeTextEntry3] : I, Lillie Urrutia solely acted as scribe for Dr. Shoaib Minaya on 09/23/2021. All medical entries made by the Scribe were at my, Dr. Minaya's, direction and personally dictated by me on 09/23/2021. I have reviewed the chart and agree that the record accurately reflects my personal performance of the history, physical exam, assessment, and plan. I have also personally directed, reviewed, and agreed with the chart.

## 2021-09-26 NOTE — DISCUSSION/SUMMARY
[FreeTextEntry1] : -Steri-Strips and dressing clean, dry and intact. No drainage or bleeding. Post-op instructions given. Advised patient to remove Steri-Strips in 1 week. \par \par -Referral for therapist given. \par \par -We discussed and reviewed the risks and benefits of the Covid vaccine. Reassurances and support given.\par \par Follow-up in 4 weeks for 6 week postpartum visit or as needed. \par \par During this visit 20 minutes were spent face-to-face with greater than 50% of the time dedicated to counseling.

## 2021-09-26 NOTE — HISTORY OF PRESENT ILLNESS
[N] : Patient is not sexually active [Y] : Positive pregnancy history [Menarche Age: ____] : age at menarche was [unfilled] [Previously active] : previously active [PapSmeardate] : 2/19/21 [TextBox_31] : NEG [LMPDate] : 12/31/20 [PGxTotal] : 7 [[facility]] : 3 [Banneriving] : 3 [PGHxABSpont] : 2 [PGxEctopic] : 2 [FreeTextEntry1] : 12/31/20

## 2021-09-28 ENCOUNTER — APPOINTMENT (OUTPATIENT)
Dept: OBGYN | Facility: HOSPITAL | Age: 41
End: 2021-09-28

## 2021-10-07 ENCOUNTER — APPOINTMENT (OUTPATIENT)
Dept: OBGYN | Facility: CLINIC | Age: 41
End: 2021-10-07

## 2021-10-25 ENCOUNTER — APPOINTMENT (OUTPATIENT)
Dept: OBGYN | Facility: CLINIC | Age: 41
End: 2021-10-25
Payer: COMMERCIAL

## 2021-10-25 VITALS
DIASTOLIC BLOOD PRESSURE: 80 MMHG | BODY MASS INDEX: 23.43 KG/M2 | HEIGHT: 61 IN | WEIGHT: 124.13 LBS | SYSTOLIC BLOOD PRESSURE: 126 MMHG

## 2021-10-25 DIAGNOSIS — Z36.9 ENCOUNTER FOR ANTENATAL SCREENING, UNSPECIFIED: ICD-10-CM

## 2021-10-25 DIAGNOSIS — Z86.32 PERSONAL HISTORY OF GESTATIONAL DIABETES: ICD-10-CM

## 2021-10-25 DIAGNOSIS — Z86.32 SUPERVISION OF PREGNANCY WITH OTHER POOR REPRODUCTIVE OR OBSTETRIC HISTORY, UNSPECIFIED TRIMESTER: ICD-10-CM

## 2021-10-25 DIAGNOSIS — Z34.93 ENCOUNTER FOR SUPERVISION OF NORMAL PREGNANCY, UNSPECIFIED, THIRD TRIMESTER: ICD-10-CM

## 2021-10-25 DIAGNOSIS — F41.9 ANXIETY DISORDER, UNSPECIFIED: ICD-10-CM

## 2021-10-25 DIAGNOSIS — Z3A.36 36 WEEKS GESTATION OF PREGNANCY: ICD-10-CM

## 2021-10-25 DIAGNOSIS — Z3A.31 31 WEEKS GESTATION OF PREGNANCY: ICD-10-CM

## 2021-10-25 DIAGNOSIS — Z34.91 ENCOUNTER FOR SUPERVISION OF NORMAL PREGNANCY, UNSPECIFIED, FIRST TRIMESTER: ICD-10-CM

## 2021-10-25 DIAGNOSIS — F32.A ANXIETY DISORDER, UNSPECIFIED: ICD-10-CM

## 2021-10-25 DIAGNOSIS — Z34.92 ENCOUNTER FOR SUPERVISION OF NORMAL PREGNANCY, UNSPECIFIED, SECOND TRIMESTER: ICD-10-CM

## 2021-10-25 DIAGNOSIS — Z32.01 ENCOUNTER FOR PREGNANCY TEST, RESULT POSITIVE: ICD-10-CM

## 2021-10-25 DIAGNOSIS — O36.80X0 PREGNANCY WITH INCONCLUSIVE FETAL VIABILITY, NOT APPLICABLE OR UNSPECIFIED: ICD-10-CM

## 2021-10-25 DIAGNOSIS — O09.299 SUPERVISION OF PREGNANCY WITH OTHER POOR REPRODUCTIVE OR OBSTETRIC HISTORY, UNSPECIFIED TRIMESTER: ICD-10-CM

## 2021-10-25 DIAGNOSIS — O09.521 SUPERVISION OF ELDERLY MULTIGRAVIDA, FIRST TRIMESTER: ICD-10-CM

## 2021-10-25 DIAGNOSIS — O09.513 SUPERVISION OF ELDERLY PRIMIGRAVIDA, THIRD TRIMESTER: ICD-10-CM

## 2021-10-25 PROCEDURE — 36415 COLL VENOUS BLD VENIPUNCTURE: CPT

## 2021-10-25 PROCEDURE — 99214 OFFICE O/P EST MOD 30 MIN: CPT | Mod: 24

## 2021-10-25 PROCEDURE — 0503F POSTPARTUM CARE VISIT: CPT

## 2021-10-25 NOTE — PHYSICAL EXAM
[Appropriately responsive] : appropriately responsive [Alert] : alert [No Acute Distress] : no acute distress [No Lymphadenopathy] : no lymphadenopathy [Soft] : soft [Non-tender] : non-tender [Non-distended] : non-distended [No HSM] : No HSM [No Lesions] : no lesions [No Mass] : no mass [Oriented x3] : oriented x3 [Examination Of The Breasts] : a normal appearance [No Discharge] : no discharge [No Masses] : no breast masses were palpable [Labia Majora] : normal [Labia Minora] : normal [Scant] : There was scant vaginal bleeding [Old Blood] : old blood was present in the vagina [Normal] : normal [Uterine Adnexae] : normal

## 2021-11-10 NOTE — OB NEONATOLOGY/PEDIATRICIAN DELIVERY SUMMARY - NSPEDSCALLREASON_OBGYN_ALL_OB
Per patient [employer] message:  From: Kirti Otoole      Created: 11/9/2021 11:59 AM      Hi Dr Herrmann! I want to inform you that the neck injection was successful! I am so grateful...thank you, thank you, thank you! I could tell the same day that it worked, but I couldn't  believe it, so I waited a few days. I have been able to do alot of things that I have been putting off and that feels great! Thank you, again!!        11/4/21: T7/8 interlaminar epidural steroid     Routed to provider.     Debora, RN-BSN  [facility]      
[employer] message sent to Pt    Kirti,     I am so very pleased to hear that!  Thanks so much for letting me know.     Take good care!    Barbie Winslow MD  [facility]    
 Section

## 2021-11-28 LAB
25(OH)D3 SERPL-MCNC: 48 NG/ML
BASOPHILS # BLD AUTO: 0.03 K/UL
BASOPHILS NFR BLD AUTO: 0.4 %
EOSINOPHIL # BLD AUTO: 0.43 K/UL
EOSINOPHIL NFR BLD AUTO: 5.3 %
FERRITIN SERPL-MCNC: 51 NG/ML
FOLATE SERPL-MCNC: >20 NG/ML
HCT VFR BLD CALC: 42.1 %
HGB BLD-MCNC: 13.9 G/DL
IMM GRANULOCYTES NFR BLD AUTO: 0.4 %
IRON SATN MFR SERPL: 23 %
IRON SERPL-MCNC: 85 UG/DL
LYMPHOCYTES # BLD AUTO: 2.17 K/UL
LYMPHOCYTES NFR BLD AUTO: 27 %
MAN DIFF?: NORMAL
MCHC RBC-ENTMCNC: 31.4 PG
MCHC RBC-ENTMCNC: 33 GM/DL
MCV RBC AUTO: 95.2 FL
MONOCYTES # BLD AUTO: 0.51 K/UL
MONOCYTES NFR BLD AUTO: 6.3 %
NEUTROPHILS # BLD AUTO: 4.87 K/UL
NEUTROPHILS NFR BLD AUTO: 60.6 %
PLATELET # BLD AUTO: 389 K/UL
RBC # BLD: 4.42 M/UL
RBC # BLD: 4.42 M/UL
RBC # FLD: 12.2 %
RETICS # AUTO: 1.3 %
RETICS AGGREG/RBC NFR: 58.8 K/UL
T3 SERPL-MCNC: 83 NG/DL
T3FREE SERPL-MCNC: 2.87 PG/ML
T4 FREE SERPL-MCNC: 1 NG/DL
T4 SERPL-MCNC: 5.1 UG/DL
TIBC SERPL-MCNC: 369 UG/DL
TSH SERPL-ACNC: 3.06 UIU/ML
UIBC SERPL-MCNC: 285 UG/DL
VIT B12 SERPL-MCNC: 711 PG/ML
WBC # FLD AUTO: 8.04 K/UL

## 2021-11-28 NOTE — END OF VISIT
[FreeTextEntry3] : I, Laura Millan, solely acted as a scribe for Dr. Shoaib Minaya on 10/25/2021 . All medical entries made by the Scribe were at my, Dr. Minaya's, direction and personally dictated by me on 10/25/2021. I have reviewed the chart and agree that the record accurately reflects my personal performance of the history, physical exam, assessment and plan. I have also personally directed, reviewed and agreed with the chart.

## 2021-11-28 NOTE — DISCUSSION/SUMMARY
[FreeTextEntry1] : 1 Benign pelvic exam. Incision site is well healed. \par \par 2 Advised patient to continue seeing therapist. Patient denies suicidal/homicidal ideologies. Recommended asking for help from her mother. \par \par 3 Work letter provided per patient request. \par \par 4 We had an informed discussion about the recommendation for patients to obtain the Covid vaccination. \par \par All questions and concerns were addressed. Support given. \par \par She will follow up in 4 weeks for a pap smear or as needed. \par \par During this visit 20 minutes were spent face-to-face with greater than 50% of the time dedicated to counseling.

## 2021-11-28 NOTE — HISTORY OF PRESENT ILLNESS
[Y] : Patient uses contraception [Tubal Occlusion] : has had a tubal occlusion [N] : Patient is not sexually active [Menarche Age: ____] : age at menarche was [unfilled] [No] : Patient does not have concerns regarding sex [Previously active] : previously active [LMPDate] : 12/31/20 [PGxTotal] : 7 [[facility]] : 3 [[facility] Hospitaliving] : 3 [PGHxABSpont] : 2 [PGxEctopic] : 2 [FreeTextEntry1] : 12/31/21

## 2021-12-06 ENCOUNTER — APPOINTMENT (OUTPATIENT)
Dept: OBGYN | Facility: CLINIC | Age: 41
End: 2021-12-06
Payer: COMMERCIAL

## 2021-12-06 VITALS
DIASTOLIC BLOOD PRESSURE: 80 MMHG | HEIGHT: 61 IN | BODY MASS INDEX: 23.08 KG/M2 | SYSTOLIC BLOOD PRESSURE: 116 MMHG | WEIGHT: 122.25 LBS

## 2021-12-06 PROCEDURE — 99213 OFFICE O/P EST LOW 20 MIN: CPT

## 2021-12-18 NOTE — DISCUSSION/SUMMARY
[FreeTextEntry1] : -Medication management of anxiety and depression were discussed as a possible next step. \par \par -Advised if her symptoms are getting worse, she needs to call the office. \par \par -Letter will be sent to patient's job for pregnancy related sickness.\par \par -Follow up in 2 weeks or as needed.\par \par During this visit 20 minutes were spent face-to-face with greater than 50% of the time dedicated\par to counseling.

## 2021-12-18 NOTE — END OF VISIT
[FreeTextEntry3] : I, [Kannan Pinedo] solely acted as scribe for Dr. Shoaib Minaya on 12/06/2021.\par All medical entries made by the Scribe were at my, Dr. Minaya’s, direction and personally\par dictated by me on 12/06/2021.  I have reviewed the chart and agree that the record\par accurately reflects my personal performance of the history, physical exam, assessment and plan. I\par have also personally directed, reviewed, and agreed with the chart.

## 2021-12-18 NOTE — HISTORY OF PRESENT ILLNESS
[Patient reported PAP Smear was normal] : Patient reported PAP Smear was normal [Menarche Age: ____] : age at menarche was [unfilled] [No] : Patient does not have concerns regarding sex [Currently Active] : currently active [FreeTextEntry1] : KATH presents today for a 4 week follow up. \par \par Reports she is still seeing a therapist at [facility] for her postpartum anxiety and depression.\par \par States she does not want to try medical management yet and continue with the therapist for now. [PapSmeardate] : 02/19/21 [TextBox_31] : NEG [GonorrheaDate] : 02/19/21 [TextBox_63] : NEG [ChlamydiaDate] : 02/19/21 [TextBox_68] : NEG

## 2021-12-27 ENCOUNTER — APPOINTMENT (OUTPATIENT)
Dept: OBGYN | Facility: CLINIC | Age: 41
End: 2021-12-27

## 2022-02-28 ENCOUNTER — APPOINTMENT (OUTPATIENT)
Dept: OBGYN | Facility: CLINIC | Age: 42
End: 2022-02-28
Payer: COMMERCIAL

## 2022-02-28 VITALS
WEIGHT: 120 LBS | HEIGHT: 61 IN | DIASTOLIC BLOOD PRESSURE: 70 MMHG | BODY MASS INDEX: 22.66 KG/M2 | SYSTOLIC BLOOD PRESSURE: 114 MMHG

## 2022-02-28 DIAGNOSIS — Z12.31 ENCOUNTER FOR SCREENING MAMMOGRAM FOR MALIGNANT NEOPLASM OF BREAST: ICD-10-CM

## 2022-02-28 DIAGNOSIS — O47.9 FALSE LABOR, UNSPECIFIED: ICD-10-CM

## 2022-02-28 DIAGNOSIS — Z12.4 ENCOUNTER FOR SCREENING FOR MALIGNANT NEOPLASM OF CERVIX: ICD-10-CM

## 2022-02-28 DIAGNOSIS — N83.299 OTHER OVARIAN CYST, UNSPECIFIED SIDE: ICD-10-CM

## 2022-02-28 DIAGNOSIS — Z11.3 ENCOUNTER FOR SCREENING FOR INFECTIONS WITH A PREDOMINANTLY SEXUAL MODE OF TRANSMISSION: ICD-10-CM

## 2022-02-28 DIAGNOSIS — N92.1 EXCESSIVE AND FREQUENT MENSTRUATION WITH IRREGULAR CYCLE: ICD-10-CM

## 2022-02-28 DIAGNOSIS — Z01.411 ENCOUNTER FOR GYNECOLOGICAL EXAMINATION (GENERAL) (ROUTINE) WITH ABNORMAL FINDINGS: ICD-10-CM

## 2022-02-28 PROCEDURE — 99396 PREV VISIT EST AGE 40-64: CPT

## 2022-02-28 RX ORDER — CHROMIUM 200 MCG
TABLET ORAL
Refills: 0 | Status: ACTIVE | COMMUNITY

## 2022-03-04 LAB — HPV HIGH+LOW RISK DNA PNL CVX: NOT DETECTED

## 2022-03-05 NOTE — END OF VISIT
[FreeTextEntry3] : I, Laura Millan, solely acted as a scribe for Dr. Elsie Henriquez on 02/28/2022 . All medical entries made by the Scribe were at my, Dr. Henriquez's, direction and personally dictated by me on 02/28/2022. I have reviewed the chart and agree that the record accurately reflects my personal performance of the history, physical exam, assessment and plan. I have also personally directed, reviewed and agreed with the chart.

## 2022-03-05 NOTE — HISTORY OF PRESENT ILLNESS
[N] : Patient reports normal menses [Tubal Occlusion] : has had a tubal occlusion [Y] : Positive pregnancy history [Menarche Age: ____] : age at menarche was [unfilled] [No] : Patient does not have concerns regarding sex [Currently Active] : currently active [PapSmeardate] : 02/19/21 [TextBox_31] : NEG  [HPVDate] : 02/19/21 [LMPDate] : 02/16/22 [TextBox_78] : NEG  [PGxTotal] : 7 [[facility]Term] : 3 [PGHxABSpont] : 2 [[facility] Centeriving] : 3 [PGxEctopic] : 2 [FreeTextEntry1] : 02/16/22

## 2022-03-05 NOTE — PHYSICAL EXAM
[Chaperone Present] : A chaperone was present in the examining room during all aspects of the physical examination [Appropriately responsive] : appropriately responsive [Alert] : alert [No Acute Distress] : no acute distress [Soft] : soft [Non-tender] : non-tender [Non-distended] : non-distended [Oriented x3] : oriented x3 [Examination Of The Breasts] : a normal appearance [No Discharge] : no discharge [No Masses] : no breast masses were palpable [Labia Minora] : normal [Labia Majora] : normal [No Bleeding] : There was no active vaginal bleeding [Normal] : normal [Uterine Adnexae] : normal [No Tenderness] : no tenderness [Nl Sphincter Tone] : normal sphincter tone [FreeTextEntry1] : KODAK Moralez [FreeTextEntry9] : Guaiac negative

## 2022-03-13 LAB — CYTOLOGY CVX/VAG DOC THIN PREP: NORMAL

## 2022-06-10 NOTE — OB PST NOTE - NS_PRENATALLABSOURCEGBSBACTPN_OBGYN_ALL_OB
Patient arrives ambulatory with daughter with complaints of increased weakness and vaginal bleeding that stopped this morning. Patient states having a hysterectomy so the bleeding is not normal. Patient's daughter states patient possibly taking long acting insulin vs.short acting.   unknown

## 2022-12-16 ENCOUNTER — OUTPATIENT (OUTPATIENT)
Dept: OUTPATIENT SERVICES | Facility: HOSPITAL | Age: 42
LOS: 1 days | End: 2022-12-16
Payer: COMMERCIAL

## 2022-12-16 ENCOUNTER — APPOINTMENT (OUTPATIENT)
Dept: MAMMOGRAPHY | Facility: CLINIC | Age: 42
End: 2022-12-16

## 2022-12-16 ENCOUNTER — RESULT REVIEW (OUTPATIENT)
Age: 42
End: 2022-12-16

## 2022-12-16 DIAGNOSIS — Z98.891 HISTORY OF UTERINE SCAR FROM PREVIOUS SURGERY: Chronic | ICD-10-CM

## 2022-12-16 DIAGNOSIS — Z12.39 ENCOUNTER FOR OTHER SCREENING FOR MALIGNANT NEOPLASM OF BREAST: ICD-10-CM

## 2022-12-16 DIAGNOSIS — R92.2 INCONCLUSIVE MAMMOGRAM: ICD-10-CM

## 2022-12-16 DIAGNOSIS — Z00.8 ENCOUNTER FOR OTHER GENERAL EXAMINATION: ICD-10-CM

## 2022-12-16 PROCEDURE — 77063 BREAST TOMOSYNTHESIS BI: CPT

## 2022-12-16 PROCEDURE — 77067 SCR MAMMO BI INCL CAD: CPT

## 2022-12-16 PROCEDURE — 77063 BREAST TOMOSYNTHESIS BI: CPT | Mod: 26

## 2022-12-16 PROCEDURE — 77067 SCR MAMMO BI INCL CAD: CPT | Mod: 26

## 2022-12-19 NOTE — DISCHARGE NOTE OB - MOOD SWINGS / DEPRESSION OR CRYING SPELLS LASTING MORE THAN 3 DAYS
"Time: 12:52 PM EST    Chief Complaint: GI problem      History of Present Illness:  Patient is a 64 y.o. year old female who presents to the emergency department with GI problem. Patient states that she colostomy procedure on  December 6th, 2022. Patient states that procedure was performed by Dr. Castañeda. Patient states that her visiting nurse noticed black matter in her stoma last Thursday. Patient states that she noticed that her discoloration improved. However, the patient states that her visiting nurse noticed black matter in her stoma again this morning. Patient reports of abdominal pain and describes it as \"knots.\" Patient states that the \"knots\" sensation started on Saturday. Patient states that she experiences abdominal pain with touch. Patient states that prior to arrival she took morphine for pain this morning.       History provided by:  Patient  History limited by: N/A.   used: No        Similar Symptoms Previously: yes  Recently seen: yes       Patient Care Team  Primary Care Provider: Haile Monique MD    Past Medical History:     Allergies   Allergen Reactions   • Azithromycin Itching     Past Medical History:   Diagnosis Date   • Abdominal pain    • Allergic rhinitis    • Anemia    • Anxiety    • Arteriosclerosis     Coronary   • Arthritis    • Bone metastases (HCC) 10/14/2022   • Bowen's disease    • Breast cancer (HCC)    • Cancer related pain 10/13/2022   • Chest pain    • Chronic pain disorder    • Cough    • Depression    • Dysphoric mood    • Fatigue    • Frequent urination    • History of colon polyps    • History of IBS    • History of kidney stones    • Hyperlipidemia    • Hypertension    • Hypothyroidism    • Insomnia    • Lumbago    • Malaise and fatigue    • Mood disorder (HCC)    • Neck pain    • Palpitations    • Peripheral edema 11/21/2022   • Precordial pain    • Sleep disturbance    • SOB (shortness of breath)      Past Surgical History:   Procedure Laterality Date "   • BREAST BIOPSY     • CARDIAC CATHETERIZATION Left 1959   • CARDIAC CATHETERIZATION N/A 04/06/2018    Procedure: Coronary angiography;  Surgeon: Art Licea MD;  Location:  [facility];  Service: Cardiovascular   • CARDIAC CATHETERIZATION N/A 04/06/2018    Procedure: Left heart cath;  Surgeon: Art Licea MD;  Location:  [facility];  Service: Cardiovascular   • CARDIAC CATHETERIZATION N/A 04/06/2018    Procedure: Left ventriculography;  Surgeon: Art Licea MD;  Location:  [facility];  Service: Cardiovascular   • CHOLECYSTECTOMY     • COLONOSCOPY  11/08/2006   • EXPLORATORY LAPAROTOMY N/A 12/6/2022    Procedure: LAPAROTOMY EXPLORATORY sigmoid resection hartmans procedure colostomy;  Surgeon: Alfred Castañeda MD;  Location[facility] MAIN OR;  Service: General;  Laterality: N/A;   • GANGLION CYST EXCISION     • HYSTERECTOMY  05/2005   • LAPAROSCOPIC GASTRIC BANDING     • TONSILLECTOMY       Family History   Problem Relation Age of Onset   • Hypertension Mother    • Rheum arthritis Mother    • Heart disease Mother    • Breast cancer Mother    • Diabetes Father    • Cancer Maternal Grandmother         colon   • Colon cancer Maternal Grandmother    • Aneurysm Paternal Grandfather    • Diabetes Other    • Fibromyalgia Other        Home Medications:  Prior to Admission medications    Medication Sig Start Date End Date Taking? Authorizing Provider   atorvastatin (LIPITOR) 20 MG tablet TAKE 1 TABLET BY MOUTH EVERY DAY 10/1/19   Yudelka Manning MD   baclofen (LIORESAL) 20 MG tablet TAKE 1 TABLET BY MOUTH THREE TIMES DAILY 12/6/19   Yudelka Manning MD   donepezil (ARICEPT) 10 MG tablet Take 10 mg by mouth Daily. 10/28/22   Provider, MD Bessie   gabapentin (NEURONTIN) 600 MG tablet TAKE 1 TABLET BY MOUTH AT BEDTIME 7/30/20   Yudelka Manning MD   HYDROcodone-acetaminophen (NORCO) 5-325 MG per tablet TAKE 1 TABLET BY MOUTH  EVERY 6 HOURS AS NEEDED FOR PAIN 12/19/22   Donald Barker MD   letrozole (FEMARA) 2.5 MG tablet Take 1 tablet by mouth Daily. 11/30/22   Donald Barker MD   levothyroxine (SYNTHROID, LEVOTHROID) 50 MCG tablet TAKE 1 TABLET BY MOUTH EVERY DAY 7/19/19   Yudelka Manning MD   LORazepam (ATIVAN) 0.5 MG tablet Take 0.5 mg by mouth Every 6 (Six) Hours As Needed.    Bessie Lopez MD   losartan (COZAAR) 100 MG tablet Take 100 mg by mouth Daily.    Bessie Lopez MD   montelukast (SINGULAIR) 10 MG tablet Take 10 mg by mouth Daily. 1/17/22   Bessie Lopez MD   Morphine (MS CONTIN) 15 MG 12 hr tablet Take 1 tablet by mouth 2 (Two) Times a Day. 12/13/22   Raudel Grande MD   naproxen (NAPROSYN) 500 MG tablet Take 500 mg by mouth 2 (Two) Times a Day With Meals.    Bessie Lopez MD   nicotine (NICODERM CQ) 21 MG/24HR patch Place 1 patch on the skin as directed by provider Daily for 7 days. 12/14/22 12/21/22  Augusto Ladd MD   ondansetron (ZOFRAN) 8 MG tablet Take 1 tablet by mouth 3 (Three) Times a Day As Needed for Nausea or Vomiting. 10/13/22   Donald Barker MD   oxyCODONE-acetaminophen (Percocet)  MG per tablet Take 1 tablet by mouth Every 6 (Six) Hours As Needed for Moderate Pain. 12/13/22 12/13/23  Alfred Castañeda MD   polyethylene glycol (MIRALAX) 17 g packet Take 17 g by mouth Daily. 12/13/22   Alfred Castañeda MD   ribociclib succinate 200 MG tablet therapy pack tablet Take 3 tablets by mouth Take As Directed. Take 3 tablets by mouth daily for 21 days then off 7 days on a 28 day cycle. 10/19/22   Donald Barker MD   rOPINIRole (REQUIP) 3 MG tablet Take 3 mg by mouth Every Evening. 6/29/22   Bessie Lopez MD   solifenacin (VESICARE) 10 MG tablet Take 1 tablet by mouth Daily for 360 days. 10/25/22 10/20/23  Destinee Myers MD   SUMAtriptan (IMITREX) 100 MG tablet Take 100 mg by mouth 1 (One) Time As Needed. 10/7/22   Provider,  "MD Bessie   traZODone (DESYREL) 150 MG tablet TAKE 1 TABLET BY MOUTH ONCE nightly 11/12/19   Yudelka Manning MD   venlafaxine XR (EFFEXOR-XR) 150 MG 24 hr capsule Take 1 capsule by mouth Daily. 11/21/22   Donald Barker MD   HYDROcodone-acetaminophen (NORCO) 5-325 MG per tablet TAKE 1 TABLET BY MOUTH EVERY 6 HOURS AS NEEDED FOR PAIN 11/28/22 12/19/22  Donald Barker MD        Social History:   Social History     Tobacco Use   • Smoking status: Every Day     Packs/day: 1.50     Years: 40.00     Pack years: 60.00     Types: Cigarettes     Start date: 1974   • Smokeless tobacco: Never   • Tobacco comments:     caffeine use 3 mt dews daily   Vaping Use   • Vaping Use: Never used   Substance Use Topics   • Alcohol use: No   • Drug use: Yes     Types: Marijuana     Comment: Prescribed Lorazepam         Review of Systems:  Review of Systems   Constitutional: Negative for chills and fever.   HENT: Negative for congestion, ear pain and sore throat.    Eyes: Negative for pain.   Respiratory: Negative for cough, chest tightness and shortness of breath.    Cardiovascular: Negative for chest pain.   Gastrointestinal: Positive for abdominal pain. Negative for diarrhea, nausea and vomiting.   Genitourinary: Negative for flank pain and hematuria.   Musculoskeletal: Negative for joint swelling.   Skin: Negative for pallor.   Neurological: Negative for seizures and headaches.   All other systems reviewed and are negative.       Physical Exam:  /68 (BP Location: Right arm, Patient Position: Sitting)   Pulse 75   Temp 99 °F (37.2 °C) (Oral)   Resp 20   Ht 167.6 cm (66\")   Wt 82.5 kg (181 lb 14.1 oz)   LMP  (LMP Unknown)   SpO2 95%   BMI 29.36 kg/m²     Physical Exam  Vitals and nursing note reviewed.   Constitutional:       General: She is not in acute distress.     Appearance: Normal appearance. She is not toxic-appearing.   HENT:      Head: Normocephalic and atraumatic.      Mouth/Throat:      Mouth: " Mucous membranes are moist.   Eyes:      General: No scleral icterus.  Cardiovascular:      Rate and Rhythm: Normal rate and regular rhythm.      Pulses: Normal pulses.      Heart sounds: Normal heart sounds.   Pulmonary:      Effort: Pulmonary effort is normal. No respiratory distress.      Breath sounds: Normal breath sounds.   Abdominal:      General: Abdomen is flat.      Palpations: Abdomen is soft.      Tenderness: There is no abdominal tenderness.      Comments: Brown liquid in colostomy bag which is leaking   Protrusion in right midline    Musculoskeletal:         General: Normal range of motion.      Cervical back: Normal range of motion and neck supple.   Skin:     General: Skin is warm and dry.   Neurological:      Mental Status: She is alert and oriented to person, place, and time. Mental status is at baseline.                Medications in the Emergency Department:  Medications   sodium chloride 0.9 % flush 10 mL (has no administration in time range)   oxyCODONE-acetaminophen (PERCOCET) 5-325 MG per tablet 1 tablet (has no administration in time range)        Labs  Lab Results (last 24 hours)     Procedure Component Value Units Date/Time    CBC & Differential [[id number]]  (Abnormal) Collected: 12/19/22 1332    Specimen: Blood Updated: 12/19/22 1340    Narrative:      The following orders were created for panel order CBC & Differential.  Procedure                               Abnormality         Status                     ---------                               -----------         ------                     CBC Auto Differential[[id number]]        Abnormal            Final result                 Please view results for these tests on the individual orders.    CBC Auto Differential [[id number]]  (Abnormal) Collected: 12/19/22 1332    Specimen: Blood Updated: 12/19/22 1340     WBC 9.18 10*3/mm3      RBC 3.27 10*6/mm3      Hemoglobin 10.1 g/dL      Hematocrit 31.8 %      MCV 97.2 fL      MCH 30.9 pg       MCHC 31.8 g/dL      RDW 17.8 %      RDW-SD 63.1 fl      MPV 9.6 fL      Platelets 525 10*3/mm3      Neutrophil % 75.3 %      Lymphocyte % 13.9 %      Monocyte % 7.3 %      Eosinophil % 2.2 %      Basophil % 0.8 %      Immature Grans % 0.5 %      Neutrophils, Absolute 6.91 10*3/mm3      Lymphocytes, Absolute 1.28 10*3/mm3      Monocytes, Absolute 0.67 10*3/mm3      Eosinophils, Absolute 0.20 10*3/mm3      Basophils, Absolute 0.07 10*3/mm3      Immature Grans, Absolute 0.05 10*3/mm3      nRBC 0.0 /100 WBC     Comprehensive Metabolic Panel [[id number]] Collected: 12/19/22 1426    Specimen: Blood Updated: 12/19/22 1429    Lipase [[id number]] Collected: 12/19/22 1426    Specimen: Blood Updated: 12/19/22 1429    Lactic Acid, Plasma [[id number]] Collected: 12/19/22 1426    Specimen: Blood Updated: 12/19/22 1429           Imaging:  No Radiology Exams Resulted Within Past 24 Hours    Procedures:  Procedures    Progress                            Medical Decision Making:  MDM  Number of Diagnoses or Management Options  Colostomy complication, unspecified (HCC)  Diagnosis management comments: Patient presents with concern for discoloration at the colostomy site.  Old records reviewed in [facility] and she was admitted here a week ago for similar symptoms.  Differential considerations include infection versus necrosis and/or ischemia.  CBC demonstrates normal white count making infection less likely and she is afebrile today.  ED course: General surgical consultation was obtained and her surgeon evaluate the patient in the ED and felt that she was appropriate for outpatient follow-up.  She is discharged stable    Risk of Complications, Morbidity, and/or Mortality  Presenting problems: high  Management options: low    Patient Progress  Patient progress: stable       Final diagnoses:   Colostomy complication, unspecified (HCC)        Disposition:  ED Disposition     ED Disposition   Discharge    Condition   Stable    Comment   --              This medical record created using voice recognition software.        Documentation assistance provided by Dana Hanley acting as scribe for Eliel Patel MD. Information recorded by the scribe was done at my direction and has been verified and validated by me.          Dana Hanley  12/19/22 130       Eliel Patel MD  12/19/22 8035     Statement Selected

## 2022-12-28 ENCOUNTER — RESULT REVIEW (OUTPATIENT)
Age: 42
End: 2022-12-28

## 2022-12-28 ENCOUNTER — OUTPATIENT (OUTPATIENT)
Dept: OUTPATIENT SERVICES | Facility: HOSPITAL | Age: 42
LOS: 1 days | End: 2022-12-28
Payer: COMMERCIAL

## 2022-12-28 ENCOUNTER — APPOINTMENT (OUTPATIENT)
Dept: ULTRASOUND IMAGING | Facility: CLINIC | Age: 42
End: 2022-12-28

## 2022-12-28 DIAGNOSIS — R92.2 INCONCLUSIVE MAMMOGRAM: ICD-10-CM

## 2022-12-28 DIAGNOSIS — Z12.39 ENCOUNTER FOR OTHER SCREENING FOR MALIGNANT NEOPLASM OF BREAST: ICD-10-CM

## 2022-12-28 DIAGNOSIS — Z98.891 HISTORY OF UTERINE SCAR FROM PREVIOUS SURGERY: Chronic | ICD-10-CM

## 2022-12-28 DIAGNOSIS — Z00.8 ENCOUNTER FOR OTHER GENERAL EXAMINATION: ICD-10-CM

## 2022-12-28 PROCEDURE — 76641 ULTRASOUND BREAST COMPLETE: CPT

## 2022-12-28 PROCEDURE — 76641 ULTRASOUND BREAST COMPLETE: CPT | Mod: 26,50

## 2023-01-24 NOTE — DISCUSSION/SUMMARY
[FreeTextEntry1] : \par Based on LMP she is 6 weeks and 5 days and JACEK is 10/07/21. She was told on Friday 2/12/21, she was measuring 6 weeks and 5 days on sono. We discussed on new OB visit and sono, confirmation of JACEK will be given.\par \par Lily is made aware of all MDs in the practice and they deliver at [facility].\par She was educated on the importance of not consuming any uncooked deli meats and no sushi. \par Prenatal vitamins to be continued and taken every day.\par Ibuprofen not to be taken during pregnancy. Acetaminophen okay during pregnancy.\par If any pelvic pain or vaginal bleeding occurs, to call office and come in immediately.\par \par We reviewed her last pap was done 10/2019 and she needs an annual visit and also a New OB and sono visit.\par Task was sent to  to call patient and schedule this appointment for both an annual gyn visit and new OB and sono.\par Patient concerns addressed, questions answered. Patient appreciative and verbalized understanding.\par \par  Patient comes in with lower back pain radiating down legs. Denies trauma. Patient was here recently for similar issues.

## 2023-03-06 ENCOUNTER — APPOINTMENT (OUTPATIENT)
Dept: OBGYN | Facility: CLINIC | Age: 43
End: 2023-03-06
Payer: COMMERCIAL

## 2023-03-06 VITALS
DIASTOLIC BLOOD PRESSURE: 72 MMHG | HEIGHT: 61 IN | WEIGHT: 118 LBS | BODY MASS INDEX: 22.28 KG/M2 | SYSTOLIC BLOOD PRESSURE: 114 MMHG

## 2023-03-06 DIAGNOSIS — Z12.31 ENCOUNTER FOR SCREENING MAMMOGRAM FOR MALIGNANT NEOPLASM OF BREAST: ICD-10-CM

## 2023-03-06 DIAGNOSIS — Z12.11 ENCOUNTER FOR SCREENING FOR MALIGNANT NEOPLASM OF COLON: ICD-10-CM

## 2023-03-06 DIAGNOSIS — Z12.39 ENCOUNTER FOR OTHER SCREENING FOR MALIGNANT NEOPLASM OF BREAST: ICD-10-CM

## 2023-03-06 DIAGNOSIS — Z01.419 ENCOUNTER FOR GYNECOLOGICAL EXAMINATION (GENERAL) (ROUTINE) W/OUT ABNORMAL FINDINGS: ICD-10-CM

## 2023-03-06 PROCEDURE — 99396 PREV VISIT EST AGE 40-64: CPT

## 2023-03-06 RX ORDER — NORETHINDRONE ACETATE AND ETHINYL ESTRADIOL, ETHINYL ESTRADIOL AND FERROUS FUMARATE 1MG-10(24)
1 MG-10 MCG / KIT ORAL DAILY
Qty: 3 | Refills: 3 | Status: ACTIVE | COMMUNITY
Start: 2023-03-06 | End: 1900-01-01

## 2023-03-06 RX ORDER — DICLOFENAC POTASSIUM 50 MG/1
50 TABLET, COATED ORAL
Qty: 10 | Refills: 0 | Status: ACTIVE | COMMUNITY
Start: 2023-02-20

## 2023-03-06 RX ORDER — COVID-19 ANTIGEN TEST
KIT MISCELLANEOUS
Qty: 8 | Refills: 0 | Status: COMPLETED | COMMUNITY
Start: 2023-01-05

## 2023-03-06 NOTE — HISTORY OF PRESENT ILLNESS
[N] : Patient reports normal menses [Tubal Occlusion] : has had a tubal occlusion [Y] : Positive pregnancy history [Menarche Age: ____] : age at menarche was [unfilled] [No] : Patient does not have concerns regarding sex [Currently Active] : currently active [Mammogramdate] : 12/16/2022 [TextBox_19] : BR2 [BreastSonogramDate] : 12/28/2022 [TextBox_25] : BR1 [PapSmeardate] : 02/28/2022 [TextBox_31] : NEG  [TextBox_78] : NEG  [HPVDate] : 02/28/2022 [LMPDate] : 02/21/2023 [PGxTotal] : 7 [[facility] for In[facility]ableslTerm] : 3 [[facility] Centeriving] : 3 [PGHxABSpont] : 2 [PGxEctopic] : 2 [FreeTextEntry1] : 02/21/2023

## 2023-03-06 NOTE — END OF VISIT
[FreeTextEntry3] : I, Shahida Brunner solely acted as scribe for Dr. Elsie Henriquez on 03/06/2023 \par All medical entries made by the Scribe were at my, Dr. Henriquez’s, direction and personally\par dictated by me on 03/06/2023 . I have reviewed the chart and agree that the record\par accurately reflects my personal performance of the history, physical exam, assessment and plan. I\par have also personally directed, reviewed, and agreed with the chart.

## 2023-03-06 NOTE — DISCUSSION/SUMMARY
[FreeTextEntry1] : Benign breast and pelvic exam. Pap done today.\par \par Prescription for mammogram screening and breast US given.\par \par We discussed the possibility of putting her on OCPs to reset her cycle for six months. Patient agrees to this method. Prescription for birth control pills electronically sent to the pharmacy. We will also get a baseline sonogram to r/o any structural causes of the bleeding. Prescription for a transvaginal sonogram given.\par \par Self-breast exam reviewed.\par \par F/u for pelvic sono or as needed.

## 2023-03-08 LAB — HPV HIGH+LOW RISK DNA PNL CVX: NOT DETECTED

## 2023-03-11 LAB — CYTOLOGY CVX/VAG DOC THIN PREP: NORMAL

## 2023-04-17 ENCOUNTER — APPOINTMENT (OUTPATIENT)
Dept: OBGYN | Facility: CLINIC | Age: 43
End: 2023-04-17
Payer: COMMERCIAL

## 2023-04-17 ENCOUNTER — APPOINTMENT (OUTPATIENT)
Dept: ANTEPARTUM | Facility: CLINIC | Age: 43
End: 2023-04-17
Payer: COMMERCIAL

## 2023-04-17 ENCOUNTER — ASOB RESULT (OUTPATIENT)
Age: 43
End: 2023-04-17

## 2023-04-17 VITALS
WEIGHT: 118 LBS | SYSTOLIC BLOOD PRESSURE: 124 MMHG | DIASTOLIC BLOOD PRESSURE: 80 MMHG | BODY MASS INDEX: 22.28 KG/M2 | HEIGHT: 61 IN

## 2023-04-17 DIAGNOSIS — Z01.419 ENCOUNTER FOR GYNECOLOGICAL EXAMINATION (GENERAL) (ROUTINE) W/OUT ABNORMAL FINDINGS: ICD-10-CM

## 2023-04-17 PROCEDURE — 76830 TRANSVAGINAL US NON-OB: CPT

## 2023-04-17 PROCEDURE — 99213 OFFICE O/P EST LOW 20 MIN: CPT

## 2023-04-23 PROBLEM — Z01.419 WELL WOMAN EXAM WITH ROUTINE GYNECOLOGICAL EXAM: Status: RESOLVED | Noted: 2019-10-01 | Resolved: 2023-04-23

## 2023-04-23 NOTE — END OF VISIT
[FreeTextEntry3] : I, Shahida Brunner solely acted as scribe for Dr. Elsie Henriquez on 04/17/2023 \par All medical entries made by the Scribe were at my, Dr. Henriquez’s, direction and personally\par dictated by me on 04/17/2023 . I have reviewed the chart and agree that the record\par accurately reflects my personal performance of the history, physical exam, assessment and plan. I\par have also personally directed, reviewed, and agreed with the chart.

## 2023-04-23 NOTE — DISCUSSION/SUMMARY
[FreeTextEntry1] : Sonogram results reviewed and showed a LT ovarian simple cyst measuring 2.93 x 2.4 x 2.66 cm. We also discussed that there is a thickening of the endometrial lining measuring 7.8 mm. Repeat imaging recommended in three to four months for cyst surveillance and to see if endo decreases in size. Prescription for a transvaginal sonogram given. \par \par F/u for pelvic sono in three to four months or as needed.

## 2023-04-23 NOTE — HISTORY OF PRESENT ILLNESS
[N] : Patient reports normal menses [Tubal Occlusion] : has had a tubal occlusion [Y] : Positive pregnancy history [Menarche Age: ____] : age at menarche was [unfilled] [No] : Patient does not have concerns regarding sex [Currently Active] : currently active [Mammogramdate] : 12/16/22 [TextBox_19] : BR2 [BreastSonogramDate] : 12/16/22 [TextBox_25] : BR1 [PapSmeardate] : 03/06/23 [TextBox_31] : neg  [HIVDate] : 09/08/21 [SyphilisDate] : 09/08/21 [TextBox_53] : neg [TextBox_58] : neg  [HPVDate] : 03/06/23 [TextBox_78] : neg  [LMPDate] : 04/11/23 [PGxTotal] : 7 [[facility] CambridgelTerm] : 3 [[facility] Hospitaliving] : 3 [PGHxABSpont] : 2 [PGxEctopic] : 2 [FreeTextEntry1] : 04/11/23

## 2023-07-27 ENCOUNTER — APPOINTMENT (OUTPATIENT)
Dept: ANTEPARTUM | Facility: CLINIC | Age: 43
End: 2023-07-27

## 2023-07-27 ENCOUNTER — APPOINTMENT (OUTPATIENT)
Dept: OBGYN | Facility: CLINIC | Age: 43
End: 2023-07-27

## 2023-07-31 ENCOUNTER — ASOB RESULT (OUTPATIENT)
Age: 43
End: 2023-07-31

## 2023-07-31 ENCOUNTER — APPOINTMENT (OUTPATIENT)
Dept: ANTEPARTUM | Facility: CLINIC | Age: 43
End: 2023-07-31
Payer: COMMERCIAL

## 2023-07-31 ENCOUNTER — NON-APPOINTMENT (OUTPATIENT)
Age: 43
End: 2023-07-31

## 2023-07-31 ENCOUNTER — APPOINTMENT (OUTPATIENT)
Dept: OBGYN | Facility: CLINIC | Age: 43
End: 2023-07-31
Payer: COMMERCIAL

## 2023-07-31 VITALS
HEIGHT: 61 IN | SYSTOLIC BLOOD PRESSURE: 100 MMHG | WEIGHT: 118 LBS | DIASTOLIC BLOOD PRESSURE: 62 MMHG | BODY MASS INDEX: 22.28 KG/M2

## 2023-07-31 DIAGNOSIS — N92.0 EXCESSIVE AND FREQUENT MENSTRUATION WITH REGULAR CYCLE: ICD-10-CM

## 2023-07-31 DIAGNOSIS — N83.209 UNSPECIFIED OVARIAN CYST, UNSPECIFIED SIDE: ICD-10-CM

## 2023-07-31 DIAGNOSIS — R93.89 ABNORMAL FINDINGS ON DIAGNOSTIC IMAGING OF OTHER SPECIFIED BODY STRUCTURES: ICD-10-CM

## 2023-07-31 PROCEDURE — 76830 TRANSVAGINAL US NON-OB: CPT

## 2023-07-31 PROCEDURE — 99213 OFFICE O/P EST LOW 20 MIN: CPT

## 2023-08-02 PROBLEM — R93.89 ENDOMETRIAL THICKENING ON ULTRASOUND: Status: RESOLVED | Noted: 2023-04-17 | Resolved: 2023-08-02

## 2023-08-02 NOTE — HISTORY OF PRESENT ILLNESS
[N] : Patient reports normal menses [Tubal Occlusion] : has had a tubal occlusion [Y] : Positive pregnancy history [Menarche Age: ____] : age at menarche was [unfilled] [No] : Patient does not have concerns regarding sex [Currently Active] : currently active [Mammogramdate] : 12/16/22 [TextBox_19] : BR2 [BreastSonogramDate] : 12/28/22 [TextBox_25] : BR1 [PapSmeardate] : 03/06/23 [TextBox_31] : NEG [HIVDate] : 09/08/21 [TextBox_53] : NEG [SyphilisDate] : 09/08/21 [TextBox_58] : NEG  [HPVDate] : 03/06/23 [TextBox_78] : NEG  [LMPDate] : 07/05/23 [PGxTotal] : 7 [[facility] Center[facility]Term] : 3 [[facility] Centeriving] : 3 [PGHxABSpont] : 2 [PGxEctopic] : 2 [FreeTextEntry1] : 07/05/23

## 2023-08-02 NOTE — DISCUSSION/SUMMARY
[FreeTextEntry1] : Previous sono results reviewed in conjunction with today's sono findings. Repeat imaging not currently recommended.   Since she is due to come back in 03/2024, she is aware to stop taking OCPs on 01/01/24 to see how her cycle is off the pill.   Prescription for birth control pills were electronically sent to the pharmacy. She has no contraindications to birth control pill use. Instructions on pill use, precautions, and side effects were discussed in detail. She is aware that the birth control pill is not perfect for preventing pregnancy even it she is compliant with taking the pill and therefore she needs condoms for back up. She was also made aware that the birth control pill does not protect her against sexual transmitted diseases and she still requires condom use. Questions answered.  F/u for annual exam in 03/2024 or as needed.

## 2023-08-02 NOTE — END OF VISIT
[FreeTextEntry3] : I, Shahida Brunner solely acted as scribe for Dr. Elsie Henriquez on 07/31/2023  All medical entries made by the Scribe were at my, Dr. Smith, direction and personally dictated by me on 07/31/2023 . I have reviewed the chart and agree that the record accurately reflects my personal performance of the history, physical exam, assessment and plan. I have also personally directed, reviewed, and agreed with the chart.

## 2023-12-20 ENCOUNTER — RESULT REVIEW (OUTPATIENT)
Age: 43
End: 2023-12-20

## 2023-12-20 ENCOUNTER — OUTPATIENT (OUTPATIENT)
Dept: OUTPATIENT SERVICES | Facility: HOSPITAL | Age: 43
LOS: 1 days | End: 2023-12-20
Payer: COMMERCIAL

## 2023-12-20 ENCOUNTER — APPOINTMENT (OUTPATIENT)
Dept: ULTRASOUND IMAGING | Facility: CLINIC | Age: 43
End: 2023-12-20
Payer: COMMERCIAL

## 2023-12-20 ENCOUNTER — APPOINTMENT (OUTPATIENT)
Dept: MAMMOGRAPHY | Facility: CLINIC | Age: 43
End: 2023-12-20
Payer: COMMERCIAL

## 2023-12-20 DIAGNOSIS — R92.2 INCONCLUSIVE MAMMOGRAM: ICD-10-CM

## 2023-12-20 DIAGNOSIS — Z00.8 ENCOUNTER FOR OTHER GENERAL EXAMINATION: ICD-10-CM

## 2023-12-20 DIAGNOSIS — Z98.891 HISTORY OF UTERINE SCAR FROM PREVIOUS SURGERY: Chronic | ICD-10-CM

## 2023-12-20 PROCEDURE — 77067 SCR MAMMO BI INCL CAD: CPT | Mod: 26

## 2023-12-20 PROCEDURE — 77063 BREAST TOMOSYNTHESIS BI: CPT | Mod: 26

## 2023-12-20 PROCEDURE — 77067 SCR MAMMO BI INCL CAD: CPT

## 2023-12-20 PROCEDURE — 76641 ULTRASOUND BREAST COMPLETE: CPT | Mod: 26,50

## 2023-12-20 PROCEDURE — 77063 BREAST TOMOSYNTHESIS BI: CPT

## 2023-12-20 PROCEDURE — 76641 ULTRASOUND BREAST COMPLETE: CPT

## 2024-01-03 NOTE — OB PROVIDER H&P - NS_ADMITREASON_OBGYN_ALL_OB
Referral to [facility] received from Dr. Steele for Smoking Cessation Counseling and Medication Management per Consult Agreement.      Called patient, left message with instructions for patient to call the clinic back at [phone], option 2.        
Labor at Term/Rupture of Membranes

## 2024-02-19 ENCOUNTER — RX RENEWAL (OUTPATIENT)
Age: 44
End: 2024-02-19

## 2024-03-11 ENCOUNTER — APPOINTMENT (OUTPATIENT)
Dept: OBGYN | Facility: CLINIC | Age: 44
End: 2024-03-11
Payer: COMMERCIAL

## 2024-03-11 VITALS
HEIGHT: 61 IN | SYSTOLIC BLOOD PRESSURE: 118 MMHG | DIASTOLIC BLOOD PRESSURE: 76 MMHG | WEIGHT: 124.38 LBS | BODY MASS INDEX: 23.48 KG/M2

## 2024-03-11 DIAGNOSIS — Z01.419 ENCOUNTER FOR GYNECOLOGICAL EXAMINATION (GENERAL) (ROUTINE) W/OUT ABNORMAL FINDINGS: ICD-10-CM

## 2024-03-11 DIAGNOSIS — Z12.31 ENCOUNTER FOR SCREENING MAMMOGRAM FOR MALIGNANT NEOPLASM OF BREAST: ICD-10-CM

## 2024-03-11 DIAGNOSIS — Z91.89 OTHER SPECIFIED PERSONAL RISK FACTORS, NOT ELSEWHERE CLASSIFIED: ICD-10-CM

## 2024-03-11 DIAGNOSIS — Z30.41 ENCOUNTER FOR SURVEILLANCE OF CONTRACEPTIVE PILLS: ICD-10-CM

## 2024-03-11 DIAGNOSIS — R92.30 DENSE BREASTS, UNSPECIFIED: ICD-10-CM

## 2024-03-11 DIAGNOSIS — Z01.411 ENCOUNTER FOR GYNECOLOGICAL EXAMINATION (GENERAL) (ROUTINE) WITH ABNORMAL FINDINGS: ICD-10-CM

## 2024-03-11 PROCEDURE — 99396 PREV VISIT EST AGE 40-64: CPT

## 2024-03-11 PROCEDURE — 99213 OFFICE O/P EST LOW 20 MIN: CPT | Mod: 25

## 2024-03-11 RX ORDER — NORETHINDRONE ACETATE AND ETHINYL ESTRADIOL, ETHINYL ESTRADIOL AND FERROUS FUMARATE 1MG-10(24)
1 MG-10 MCG / KIT ORAL DAILY
Qty: 3 | Refills: 3 | Status: ACTIVE | COMMUNITY
Start: 2024-03-11 | End: 1900-01-01

## 2024-03-11 NOTE — PHYSICAL EXAM
[Chaperone Present] : A chaperone was present in the examining room during all aspects of the physical examination [Appropriately responsive] : appropriately responsive [No Acute Distress] : no acute distress [Alert] : alert [Soft] : soft [Non-tender] : non-tender [Non-distended] : non-distended [Oriented x3] : oriented x3 [Examination Of The Breasts] : a normal appearance [No Discharge] : no discharge [No Masses] : no breast masses were palpable [Labia Minora] : normal [Labia Majora] : normal [No Bleeding] : There was no active vaginal bleeding [Normal] : normal [Uterine Adnexae] : normal

## 2024-03-12 LAB — HPV HIGH+LOW RISK DNA PNL CVX: NOT DETECTED

## 2024-03-15 LAB — CYTOLOGY CVX/VAG DOC THIN PREP: NORMAL

## 2024-03-16 RX ORDER — IBUPROFEN 600 MG/1
600 TABLET, FILM COATED ORAL EVERY 6 HOURS
Qty: 60 | Refills: 2 | Status: DISCONTINUED | COMMUNITY
Start: 2021-10-25 | End: 2024-03-16

## 2024-03-16 RX ORDER — NORETHINDRONE ACETATE AND ETHINYL ESTRADIOL, ETHINYL ESTRADIOL AND FERROUS FUMARATE 1MG-10(24)
1 MG-10 MCG / KIT ORAL DAILY
Qty: 84 | Refills: 0 | Status: DISCONTINUED | COMMUNITY
Start: 2023-07-31 | End: 2024-03-16

## 2024-03-16 RX ORDER — UBROGEPANT 100 MG/1
100 TABLET ORAL
Qty: 10 | Refills: 0 | Status: DISCONTINUED | COMMUNITY
Start: 2023-02-20 | End: 2024-03-16

## 2024-03-16 RX ORDER — PRENATAL VIT 49/IRON FUM/FOLIC 6.75-0.2MG
TABLET ORAL
Refills: 0 | Status: DISCONTINUED | COMMUNITY
End: 2024-03-16

## 2024-03-16 RX ORDER — ASCORBIC ACID 500 MG
TABLET ORAL
Refills: 0 | Status: ACTIVE | COMMUNITY

## 2024-03-16 NOTE — END OF VISIT
[FreeTextEntry3] : I, Shahida Brunner solely acted as scribe for Dr. Elsie Henriquez on 03/11/2024  All medical entries made by the Scribe were at my, Dr. Smith, direction and personally dictated by me on 03/11/2024 . I have reviewed the chart and agree that the record accurately reflects my personal performance of the history, physical exam, assessment and plan. I have also personally directed, reviewed, and agreed with the chart.

## 2024-03-16 NOTE — HISTORY OF PRESENT ILLNESS
[Patient reported PAP Smear was normal] : Patient reported PAP Smear was normal [Y] : Positive pregnancy history [Menarche Age: ____] : age at menarche was [unfilled] [Mammogramdate] : 12/20/2023 [TextBox_19] : BR2 [TextBox_25] : BR2 [PapSmeardate] : 03/06/2023 [BreastSonogramDate] : 12/20/2023 [HIVDate] : 09/08/2021 [TextBox_53] : NEG [TextBox_58] : NEG [SyphilisDate] : 09/08/2021 [GonorrheaDate] : 02/19/2021 [TextBox_63] : NEG [TextBox_68] : NEG [ChlamydiaDate] : 02/19/2021 [HPVDate] : 03/06/2023 [TextBox_78] : NEG [PGxTotal] : 7 [LMPDate] : 02/15/2024 [[facility]Term] : 3 [[facility] Westiving] : 3 [PGHxABSpont] : 2 [PGxEctopic] : 2 [FreeTextEntry1] : 02/15/2024

## 2024-03-16 NOTE — PLAN
[FreeTextEntry1] : Pap done today.  Prescription for mammogram screening and breast US given. We discussed her TC score over 20 and her increased risk of breast cancer. We discussed the recommendation for increased breast surveillance with Breast MRI. Prescription given for a breast MRI secondary to a high TC score. She is aware to complete breast MRI in 06/2024 and mammo and breast sono in 12/2024.   Prescription for birth control pills were electronically sent to the pharmacy. She has no contraindications to birth control pill use. Instructions on pill use, precautions, and side effects were discussed in detail. She is aware that the birth control pill is not perfect for preventing pregnancy even if she is compliant with taking the pill and therefore, she needs condoms for back up. She was also made aware that the birth control pill does not protect her against sexual transmitted diseases, and she still requires condom use. Questions answered.  Self-breast exam reviewed.  She will follow up annually and as needed.

## 2024-06-19 ENCOUNTER — OUTPATIENT (OUTPATIENT)
Dept: OUTPATIENT SERVICES | Facility: HOSPITAL | Age: 44
LOS: 1 days | End: 2024-06-19
Payer: COMMERCIAL

## 2024-06-19 ENCOUNTER — APPOINTMENT (OUTPATIENT)
Dept: MRI IMAGING | Facility: CLINIC | Age: 44
End: 2024-06-19
Payer: COMMERCIAL

## 2024-06-19 DIAGNOSIS — Z00.8 ENCOUNTER FOR OTHER GENERAL EXAMINATION: ICD-10-CM

## 2024-06-19 DIAGNOSIS — Z98.891 HISTORY OF UTERINE SCAR FROM PREVIOUS SURGERY: Chronic | ICD-10-CM

## 2024-06-19 PROCEDURE — C8937: CPT

## 2024-06-19 PROCEDURE — 77049 MRI BREAST C-+ W/CAD BI: CPT | Mod: 26

## 2024-06-19 PROCEDURE — A9585: CPT

## 2024-06-19 PROCEDURE — C8908: CPT

## 2024-06-24 ENCOUNTER — NON-APPOINTMENT (OUTPATIENT)
Age: 44
End: 2024-06-24

## 2025-01-09 ENCOUNTER — APPOINTMENT (OUTPATIENT)
Dept: ULTRASOUND IMAGING | Facility: CLINIC | Age: 45
End: 2025-01-09
Payer: COMMERCIAL

## 2025-01-09 ENCOUNTER — APPOINTMENT (OUTPATIENT)
Dept: MRI IMAGING | Facility: CLINIC | Age: 45
End: 2025-01-09
Payer: COMMERCIAL

## 2025-01-09 ENCOUNTER — RESULT REVIEW (OUTPATIENT)
Age: 45
End: 2025-01-09

## 2025-01-09 ENCOUNTER — OUTPATIENT (OUTPATIENT)
Dept: OUTPATIENT SERVICES | Facility: HOSPITAL | Age: 45
LOS: 1 days | End: 2025-01-09
Payer: COMMERCIAL

## 2025-01-09 ENCOUNTER — APPOINTMENT (OUTPATIENT)
Dept: MAMMOGRAPHY | Facility: CLINIC | Age: 45
End: 2025-01-09
Payer: COMMERCIAL

## 2025-01-09 DIAGNOSIS — Z00.8 ENCOUNTER FOR OTHER GENERAL EXAMINATION: ICD-10-CM

## 2025-01-09 DIAGNOSIS — Z98.891 HISTORY OF UTERINE SCAR FROM PREVIOUS SURGERY: Chronic | ICD-10-CM

## 2025-01-09 PROCEDURE — 77063 BREAST TOMOSYNTHESIS BI: CPT

## 2025-01-09 PROCEDURE — 77049 MRI BREAST C-+ W/CAD BI: CPT | Mod: 26

## 2025-01-09 PROCEDURE — 77063 BREAST TOMOSYNTHESIS BI: CPT | Mod: 26

## 2025-01-09 PROCEDURE — 77067 SCR MAMMO BI INCL CAD: CPT | Mod: 26

## 2025-01-09 PROCEDURE — C8937: CPT

## 2025-01-09 PROCEDURE — A9585: CPT

## 2025-01-09 PROCEDURE — 77067 SCR MAMMO BI INCL CAD: CPT

## 2025-01-09 PROCEDURE — 76641 ULTRASOUND BREAST COMPLETE: CPT | Mod: 26,50

## 2025-01-09 PROCEDURE — C8908: CPT

## 2025-01-09 PROCEDURE — 76641 ULTRASOUND BREAST COMPLETE: CPT

## 2025-04-21 ENCOUNTER — RX RENEWAL (OUTPATIENT)
Age: 45
End: 2025-04-21

## 2025-07-15 ENCOUNTER — TRANSCRIPTION ENCOUNTER (OUTPATIENT)
Age: 45
End: 2025-07-15

## 2025-07-17 ENCOUNTER — TRANSCRIPTION ENCOUNTER (OUTPATIENT)
Age: 45
End: 2025-07-17

## 2025-07-31 ENCOUNTER — APPOINTMENT (OUTPATIENT)
Dept: OBGYN | Facility: CLINIC | Age: 45
End: 2025-07-31
Payer: COMMERCIAL

## 2025-07-31 VITALS
HEIGHT: 61 IN | DIASTOLIC BLOOD PRESSURE: 70 MMHG | WEIGHT: 120 LBS | BODY MASS INDEX: 22.66 KG/M2 | SYSTOLIC BLOOD PRESSURE: 118 MMHG

## 2025-07-31 DIAGNOSIS — Z12.31 ENCOUNTER FOR SCREENING MAMMOGRAM FOR MALIGNANT NEOPLASM OF BREAST: ICD-10-CM

## 2025-07-31 DIAGNOSIS — Z01.419 ENCOUNTER FOR GYNECOLOGICAL EXAMINATION (GENERAL) (ROUTINE) W/OUT ABNORMAL FINDINGS: ICD-10-CM

## 2025-07-31 DIAGNOSIS — Z87.42 PERSONAL HISTORY OF OTHER DISEASES OF THE FEMALE GENITAL TRACT: ICD-10-CM

## 2025-07-31 DIAGNOSIS — Z01.411 ENCOUNTER FOR GYNECOLOGICAL EXAMINATION (GENERAL) (ROUTINE) WITH ABNORMAL FINDINGS: ICD-10-CM

## 2025-07-31 DIAGNOSIS — Z30.41 ENCOUNTER FOR SURVEILLANCE OF CONTRACEPTIVE PILLS: ICD-10-CM

## 2025-07-31 DIAGNOSIS — Z91.89 OTHER SPECIFIED PERSONAL RISK FACTORS, NOT ELSEWHERE CLASSIFIED: ICD-10-CM

## 2025-07-31 DIAGNOSIS — N83.209 UNSPECIFIED OVARIAN CYST, UNSPECIFIED SIDE: ICD-10-CM

## 2025-07-31 PROCEDURE — 99396 PREV VISIT EST AGE 40-64: CPT

## 2025-07-31 PROCEDURE — 99459 PELVIC EXAMINATION: CPT | Mod: NC

## 2025-07-31 RX ORDER — NORETHINDRONE ACETATE AND ETHINYL ESTRADIOL, ETHINYL ESTRADIOL AND FERROUS FUMARATE 1MG-10(24)
1 MG-10 MCG / KIT ORAL DAILY
Qty: 3 | Refills: 3 | Status: ACTIVE | COMMUNITY
Start: 2025-07-31 | End: 1900-01-01

## 2025-08-01 PROBLEM — N83.209 SIMPLE OVARIAN CYST: Status: RESOLVED | Noted: 2023-04-17 | Resolved: 2025-08-01

## 2025-08-01 PROBLEM — Z87.42 HISTORY OF MENORRHAGIA: Status: RESOLVED | Noted: 2023-03-06 | Resolved: 2025-08-01
